# Patient Record
Sex: MALE | Race: BLACK OR AFRICAN AMERICAN | Employment: OTHER | URBAN - METROPOLITAN AREA
[De-identification: names, ages, dates, MRNs, and addresses within clinical notes are randomized per-mention and may not be internally consistent; named-entity substitution may affect disease eponyms.]

---

## 2017-02-20 PROBLEM — I16.1 HYPERTENSIVE EMERGENCY: Status: ACTIVE | Noted: 2017-02-20

## 2017-04-05 PROBLEM — J18.9 PNEUMONIA: Status: ACTIVE | Noted: 2017-04-05

## 2017-04-05 PROBLEM — R77.8 ELEVATED TROPONIN: Status: ACTIVE | Noted: 2017-04-05

## 2017-08-14 PROBLEM — N17.9 AKI (ACUTE KIDNEY INJURY) (HCC): Status: ACTIVE | Noted: 2017-08-14

## 2017-08-29 PROBLEM — I47.9 PAROXYSMAL TACHYCARDIA, UNSPECIFIED (HCC): Status: ACTIVE | Noted: 2017-08-29

## 2017-08-29 PROBLEM — G45.9 TIA (TRANSIENT ISCHEMIC ATTACK): Status: ACTIVE | Noted: 2017-08-29

## 2017-08-29 PROBLEM — R29.898 LEFT ARM WEAKNESS: Status: ACTIVE | Noted: 2017-08-29

## 2017-12-09 PROBLEM — R07.89 CHEST PRESSURE: Status: ACTIVE | Noted: 2017-12-09

## 2017-12-13 PROBLEM — T82.7XXA BACTEREMIA ASSOCIATED WITH INTRAVASCULAR LINE (HCC): Status: ACTIVE | Noted: 2017-12-13

## 2017-12-13 PROBLEM — R78.81 BACTEREMIA ASSOCIATED WITH INTRAVASCULAR LINE (HCC): Status: ACTIVE | Noted: 2017-12-13

## 2018-01-18 PROBLEM — N18.6 ESRD (END STAGE RENAL DISEASE) (HCC): Status: ACTIVE | Noted: 2018-01-18

## 2018-01-24 PROBLEM — L08.9 LEFT FOOT INFECTION: Status: ACTIVE | Noted: 2018-01-24

## 2018-02-10 PROBLEM — T82.49XA COMPLICATIONS, DIALYSIS, CATHETER, MECHANICAL (HCC): Status: ACTIVE | Noted: 2018-02-10

## 2018-02-10 PROBLEM — R45.851 SUICIDAL IDEATION: Status: ACTIVE | Noted: 2018-02-10

## 2018-03-16 PROBLEM — J18.9 PNEUMONIA: Status: RESOLVED | Noted: 2017-04-05 | Resolved: 2018-03-16

## 2018-03-16 PROBLEM — L08.9 LEFT FOOT INFECTION: Status: RESOLVED | Noted: 2018-01-24 | Resolved: 2018-03-16

## 2018-03-16 PROBLEM — R29.898 LEFT ARM WEAKNESS: Status: RESOLVED | Noted: 2017-08-29 | Resolved: 2018-03-16

## 2018-03-16 PROBLEM — I16.1 HYPERTENSIVE EMERGENCY: Status: RESOLVED | Noted: 2017-02-20 | Resolved: 2018-03-16

## 2018-03-16 PROBLEM — T82.49XA COMPLICATIONS, DIALYSIS, CATHETER, MECHANICAL (HCC): Status: RESOLVED | Noted: 2018-02-10 | Resolved: 2018-03-16

## 2018-03-16 PROBLEM — R78.81 BACTEREMIA ASSOCIATED WITH INTRAVASCULAR LINE (HCC): Status: RESOLVED | Noted: 2017-12-13 | Resolved: 2018-03-16

## 2018-03-16 PROBLEM — T82.7XXA BACTEREMIA ASSOCIATED WITH INTRAVASCULAR LINE (HCC): Status: RESOLVED | Noted: 2017-12-13 | Resolved: 2018-03-16

## 2018-03-16 PROBLEM — N17.9 AKI (ACUTE KIDNEY INJURY) (HCC): Status: RESOLVED | Noted: 2017-08-14 | Resolved: 2018-03-16

## 2018-06-07 PROBLEM — N18.6 END STAGE RENAL FAILURE ON DIALYSIS (HCC): Status: ACTIVE | Noted: 2018-06-07

## 2018-06-07 PROBLEM — Z99.2 END STAGE RENAL FAILURE ON DIALYSIS (HCC): Status: ACTIVE | Noted: 2018-06-07

## 2018-07-06 PROBLEM — A41.9 SEPSIS (HCC): Status: ACTIVE | Noted: 2018-07-06

## 2018-08-11 PROBLEM — A04.72 CLOSTRIDIUM DIFFICILE COLITIS: Status: ACTIVE | Noted: 2018-08-11

## 2018-12-30 PROBLEM — L08.9 INFECTED ULCER OF SKIN (HCC): Status: ACTIVE | Noted: 2018-12-30

## 2018-12-30 PROBLEM — L98.499 INFECTED ULCER OF SKIN (HCC): Status: ACTIVE | Noted: 2018-12-30

## 2019-03-06 ENCOUNTER — OFFICE VISIT (OUTPATIENT)
Dept: FAMILY MEDICINE CLINIC | Age: 44
End: 2019-03-06

## 2019-03-06 VITALS
HEIGHT: 68 IN | HEART RATE: 90 BPM | DIASTOLIC BLOOD PRESSURE: 95 MMHG | OXYGEN SATURATION: 96 % | SYSTOLIC BLOOD PRESSURE: 172 MMHG | TEMPERATURE: 96.4 F | WEIGHT: 245 LBS | BODY MASS INDEX: 37.13 KG/M2 | RESPIRATION RATE: 16 BRPM

## 2019-03-06 DIAGNOSIS — Z79.4 TYPE 2 DIABETES MELLITUS WITH CHRONIC KIDNEY DISEASE ON CHRONIC DIALYSIS, WITH LONG-TERM CURRENT USE OF INSULIN (HCC): Primary | ICD-10-CM

## 2019-03-06 DIAGNOSIS — I21.9 MYOCARDIAL INFARCTION, UNSPECIFIED MI TYPE, UNSPECIFIED ARTERY (HCC): ICD-10-CM

## 2019-03-06 DIAGNOSIS — Z99.2 TYPE 2 DIABETES MELLITUS WITH CHRONIC KIDNEY DISEASE ON CHRONIC DIALYSIS, WITH LONG-TERM CURRENT USE OF INSULIN (HCC): Primary | ICD-10-CM

## 2019-03-06 DIAGNOSIS — F32.A DEPRESSION, UNSPECIFIED DEPRESSION TYPE: ICD-10-CM

## 2019-03-06 DIAGNOSIS — I51.9 HEART DISEASE: ICD-10-CM

## 2019-03-06 DIAGNOSIS — N18.6 TYPE 2 DIABETES MELLITUS WITH CHRONIC KIDNEY DISEASE ON CHRONIC DIALYSIS, WITH LONG-TERM CURRENT USE OF INSULIN (HCC): Primary | ICD-10-CM

## 2019-03-06 DIAGNOSIS — B20 HIV (HUMAN IMMUNODEFICIENCY VIRUS INFECTION) (HCC): ICD-10-CM

## 2019-03-06 DIAGNOSIS — I10 ESSENTIAL HYPERTENSION: ICD-10-CM

## 2019-03-06 DIAGNOSIS — N18.6 STAGE 5 CHRONIC KIDNEY DISEASE ON CHRONIC DIALYSIS (HCC): ICD-10-CM

## 2019-03-06 DIAGNOSIS — Z99.2 STAGE 5 CHRONIC KIDNEY DISEASE ON CHRONIC DIALYSIS (HCC): ICD-10-CM

## 2019-03-06 DIAGNOSIS — E11.22 TYPE 2 DIABETES MELLITUS WITH CHRONIC KIDNEY DISEASE ON CHRONIC DIALYSIS, WITH LONG-TERM CURRENT USE OF INSULIN (HCC): Primary | ICD-10-CM

## 2019-03-06 RX ORDER — INSULIN GLARGINE 100 [IU]/ML
10 INJECTION, SOLUTION SUBCUTANEOUS
COMMUNITY
End: 2019-03-06 | Stop reason: SDUPTHER

## 2019-03-06 RX ORDER — GABAPENTIN 100 MG/1
CAPSULE ORAL 3 TIMES DAILY
COMMUNITY
End: 2019-03-06 | Stop reason: SDUPTHER

## 2019-03-06 RX ORDER — ATAZANAVIR 300 MG/1
300 CAPSULE ORAL
COMMUNITY

## 2019-03-06 RX ORDER — INSULIN LISPRO 100 [IU]/ML
5 INJECTION, SOLUTION INTRAVENOUS; SUBCUTANEOUS
COMMUNITY
End: 2019-03-06 | Stop reason: SDUPTHER

## 2019-03-06 RX ORDER — INSULIN LISPRO 100 [IU]/ML
5 INJECTION, SOLUTION INTRAVENOUS; SUBCUTANEOUS
Qty: 1 VIAL | Refills: 4 | Status: ON HOLD
Start: 2019-03-06 | End: 2019-12-23 | Stop reason: SDUPTHER

## 2019-03-06 RX ORDER — BUPROPION HYDROCHLORIDE 150 MG/1
TABLET, EXTENDED RELEASE ORAL 2 TIMES DAILY
COMMUNITY
End: 2019-12-23 | Stop reason: SDUPTHER

## 2019-03-06 RX ORDER — LOSARTAN POTASSIUM 50 MG/1
50 TABLET ORAL DAILY
Qty: 90 TAB | Refills: 0 | Status: ON HOLD | OUTPATIENT
Start: 2019-03-06 | End: 2019-12-23 | Stop reason: SDUPTHER

## 2019-03-06 RX ORDER — CARVEDILOL 25 MG/1
1 TABLET ORAL DAILY
Refills: 1 | COMMUNITY
Start: 2019-01-28 | End: 2019-12-23 | Stop reason: SDUPTHER

## 2019-03-06 RX ORDER — SEVELAMER CARBONATE 800 MG/1
800 TABLET, FILM COATED ORAL
COMMUNITY
Start: 2019-02-18 | End: 2020-03-13

## 2019-03-06 RX ORDER — TRAZODONE HYDROCHLORIDE 50 MG/1
50 TABLET ORAL
COMMUNITY

## 2019-03-06 RX ORDER — GABAPENTIN 100 MG/1
100 CAPSULE ORAL 3 TIMES DAILY
Qty: 180 CAP | Refills: 0 | Status: ON HOLD | OUTPATIENT
Start: 2019-03-06 | End: 2019-12-23 | Stop reason: SDUPTHER

## 2019-03-06 RX ORDER — PROMETHAZINE HYDROCHLORIDE 12.5 MG/1
12.5 TABLET ORAL
COMMUNITY
End: 2020-08-17

## 2019-03-06 RX ORDER — FLUOXETINE HYDROCHLORIDE 20 MG/1
20 CAPSULE ORAL DAILY
Qty: 90 CAP | Refills: 0 | Status: SHIPPED | OUTPATIENT
Start: 2019-03-06 | End: 2021-04-24

## 2019-03-06 RX ORDER — RITONAVIR 100 MG/1
100 TABLET, FILM COATED ORAL
COMMUNITY

## 2019-03-06 RX ORDER — CLINDAMYCIN HYDROCHLORIDE 300 MG/1
1 CAPSULE ORAL DAILY
Refills: 0 | COMMUNITY
Start: 2019-01-02 | End: 2019-10-03 | Stop reason: ALTCHOICE

## 2019-03-06 RX ORDER — INSULIN GLARGINE 100 [IU]/ML
10 INJECTION, SOLUTION SUBCUTANEOUS
Qty: 1 VIAL | Refills: 4 | Status: ON HOLD | OUTPATIENT
Start: 2019-03-06 | End: 2019-12-23

## 2019-03-06 NOTE — PROGRESS NOTES
Patient is here to establish care with new pcp. Patient brought all medications in, he is requesting a refill on some, patient will discuss with provider. 1. Have you been to the ER, urgent care clinic since your last visit? Hospitalized since your last visit?no    2. Have you seen or consulted any other health care providers outside of the [address] [place] [address] since your last visit? Include any pap smears or colon screening.  no

## 2019-03-07 NOTE — PROGRESS NOTES
ESTABLISH CARE VISIT    SUBJECTIVE:     Chief Complaint   Patient presents with    Establish Care    GERD       HPI: 44 y.o. male with PMHx significant for HIV, heart disease, heart attack (at 38 yo), diabetes, depression, CKD is here for the above chief complaint(s). Patient's last PCP was     Chronic Kidney Disease  Patient has been on dialysis for 1 year, nephrologist is Dr. Alexis Cuevas. Will request records. [address]. Patient saw nephrologist 3 weeks ago. Diabetes  Patient is taking Lantus 10 units in the evening and Humalog 5 units with meals. He has been checking his glucose levels. Fasting typically around 130's about the same before meals. Patient denies frequent urination, excessive thirst, hypoglycemic events (lightheadedness/dizziness), nausea, vomiting, shakiness. GERD  Patient has had heartburn, nausea and epigastric pain. He does have history of nausea and some vomiting, he has had 4-5 episodes of vomiting over the last week. Patient has tried prilosec and TUMS with mild relief, but still having heartburn. Symptoms are intermittent, some days the symptoms last all day. Patient has never had a colonoscopy, no family history of colon cancer. Bowel movements- have been small, \"rabbit pellets. \"     HIV  Patient was seeing Dr. Chris Humphrey, but has difficulty getting there. He states that his counts have been good and he has been taking his medication regularly. He needs new ID physician. Health Maintenance:    Eye   no complaints, last eye exam: 1/2018, needs an exam  Oral Health   no complaints, last exam: a LONG time, will give referral  U/A   no UTI sxs, patient has chronic kidney on dialysis x 1 year  Cardiac- denies history, denies chest pain. patient has coronary stent- 3 years ago  Pulmonary health/Smoking history- , denies cough, SOB.- Smoking - Current non-smoker.  Lung cancer screening: with low-dose computed tomography  (LDCT) in adults aged 54 to [de-identified] years who have a 27 pack-year smoking history and currently smoke or have quit within the past 15 yea  Testicular Exam - does self-exams, declines exam today. Toney Echols- 3 weeks    Review of Systems   Constitutional: Negative for chills, fever, malaise/fatigue and weight loss. Eyes: Negative for blurred vision, double vision and pain. Respiratory: Negative for cough, sputum production, shortness of breath and wheezing. Cardiovascular: Negative for chest pain, palpitations, orthopnea, claudication and leg swelling. Gastrointestinal: Positive for heartburn, nausea and vomiting. Negative for abdominal pain, blood in stool, constipation, diarrhea and melena. Genitourinary: Negative for dysuria, frequency and urgency. Skin: Negative for itching () and rash. Neurological: Negative for dizziness, tingling and headaches. [unfilled]  Current Outpatient Medications   Medication Sig    carvedilol (COREG) 25 mg tablet Take 1 Tab by mouth daily.  sevelamer carbonate (RENVELA) 800 mg tab tab Take 1 Tab by mouth daily.  clindamycin (CLEOCIN) 300 mg capsule Take 1 Cap by mouth daily.  atazanavir (REYATAZ) 300 mg capsule Take 300 mg by mouth Daily (before breakfast).  traZODone (DESYREL) 50 mg tablet Take  by mouth nightly.  gabapentin (NEURONTIN) 100 mg capsule Take  by mouth three (3) times daily.  buPROPion SR (WELLBUTRIN SR) 150 mg SR tablet Take  by mouth two (2) times a day.  promethazine (PHENERGAN) 12.5 mg tablet Take  by mouth every six (6) hours as needed for Nausea.  ritonavir (NORVIR) 100 mg tablet Take  by mouth two (2) times daily (with meals).  dolutegravir (TIVICAY) 50 mg tab tablet Take  by mouth. No current facility-administered medications for this visit.       Health Maintenance   Topic Date Due    DTaP/Tdap/Td series (1 - Tdap) 03/27/1997    Influenza Age 5 to Adult  08/01/2018       Medications and Allergies: Reviewed and confirmed in the chart    Past Medical Hx: Reviewed and confirmed in the chart  No past medical history on file. There is no problem list on file for this patient. Family Hx, Surgical Hx, Social Hx: Reviewed and updated in EMR    OBJECTIVE:  Vitals:    03/06/19 1349   BP: (!) 172/95   Pulse: 90   Resp: 16   Temp: 96.4 °F (35.8 °C)   TempSrc: Oral   SpO2: 96%   Weight: 245 lb (111.1 kg)   Height: 5' 8\" (1.727 m)       BP Readings from Last 3 Encounters:   03/06/19 (!) 172/95     Wt Readings from Last 3 Encounters:   03/06/19 245 lb (111.1 kg)     Physical Exam   Constitutional: He is oriented to person, place, and time and well-developed, well-nourished, and in no distress. No distress. Neck: Normal range of motion. Neck supple. No thyromegaly present. Cardiovascular: Normal rate, regular rhythm, normal heart sounds and intact distal pulses. Exam reveals no gallop and no friction rub. No murmur heard. Pulmonary/Chest: Effort normal and breath sounds normal. No respiratory distress. He has no wheezes. He has no rales. He exhibits no tenderness. Abdominal: Soft. Bowel sounds are normal. He exhibits no distension and no mass. There is no tenderness. There is no rebound and no guarding. Lymphadenopathy:     He has no cervical adenopathy. Neurological: He is alert and oriented to person, place, and time. Skin: Skin is warm and dry. He is not diaphoretic. Psychiatric: Mood, memory, affect and judgment normal.   Nursing note and vitals reviewed. Nursing Notes Reviewed    ASSESSMENT AND PLAN  Diagnoses and all orders for this visit:    1. Type 2 diabetes mellitus with chronic kidney disease on chronic dialysis, with long-term current use of insulin (HCC)  -     CBC WITH AUTOMATED DIFF; Future  -     METABOLIC PANEL, COMPREHENSIVE; Future  -     HEMOGLOBIN A1C WITH EAG; Future  -     REFERRAL TO INFECTIOUS DISEASE    2. HIV (human immunodeficiency virus infection) ([facility]  -     REFERRAL TO INFECTIOUS DISEASE    3.  Depression, unspecified depression type  -     REFERRAL TO PSYCHIATRY  -     FLUoxetine (PROZAC) 20 mg capsule; Take 1 Cap by mouth daily. 4. Stage 5 chronic kidney disease on chronic dialysis ([facility]  -     CBC WITH AUTOMATED DIFF; Future  -     METABOLIC PANEL, COMPREHENSIVE; Future    5. Heart disease  -     REFERRAL TO CARDIOLOGY    6. Myocardial infarction, unspecified MI type, unspecified artery (HCC)  -     REFERRAL TO CARDIOLOGY    7. Essential hypertension  -     losartan (COZAAR) 50 mg tablet; Take 1 Tab by mouth daily. Other orders  -     insulin lispro (HUMALOG U-100 INSULIN) 100 unit/mL injection; 5 Units by SubCUTAneous route three (3) times daily (with meals). -     insulin glargine (LANTUS U-100 INSULIN) 100 unit/mL injection; 10 Units by SubCUTAneous route nightly.  -     gabapentin (NEURONTIN) 100 mg capsule; Take 1 Cap by mouth three (3) times daily. 8. GERD     - Omeprazole 20mg daily x 6 weeks. Follow-up in 1 month for re-check. Orders Placed This Encounter    carvedilol (COREG) 25 mg tablet    sevelamer carbonate (RENVELA) 800 mg tab tab    clindamycin (CLEOCIN) 300 mg capsule    atazanavir (REYATAZ) 300 mg capsule    traZODone (DESYREL) 50 mg tablet    gabapentin (NEURONTIN) 100 mg capsule    buPROPion SR (WELLBUTRIN SR) 150 mg SR tablet    promethazine (PHENERGAN) 12.5 mg tablet    ritonavir (NORVIR) 100 mg tablet    dolutegravir (TIVICAY) 50 mg tab tablet       No future appointments. AVS printed and provided to patient    Assessment and plan above discussed with patient, patient voiced understanding and agreement with plan. More than 50% of this 30 minute visit was spent face to face counseling the patient about the etiology and treatment options for the above health conditions outlined in assessment and plan     Lexy Johnston [address], [address] [place] [address], [address] [place] [address] [phone]  [place] [phone] or [phone]

## 2019-03-11 ENCOUNTER — TELEPHONE (OUTPATIENT)
Dept: FAMILY MEDICINE CLINIC | Age: 44
End: 2019-03-11

## 2019-03-11 DIAGNOSIS — E11.22 TYPE 2 DIABETES MELLITUS WITH CHRONIC KIDNEY DISEASE ON CHRONIC DIALYSIS, WITH LONG-TERM CURRENT USE OF INSULIN (HCC): Primary | ICD-10-CM

## 2019-03-11 DIAGNOSIS — Z99.2 TYPE 2 DIABETES MELLITUS WITH CHRONIC KIDNEY DISEASE ON CHRONIC DIALYSIS, WITH LONG-TERM CURRENT USE OF INSULIN (HCC): Primary | ICD-10-CM

## 2019-03-11 DIAGNOSIS — N18.6 TYPE 2 DIABETES MELLITUS WITH CHRONIC KIDNEY DISEASE ON CHRONIC DIALYSIS, WITH LONG-TERM CURRENT USE OF INSULIN (HCC): Primary | ICD-10-CM

## 2019-03-11 DIAGNOSIS — Z79.4 TYPE 2 DIABETES MELLITUS WITH CHRONIC KIDNEY DISEASE ON CHRONIC DIALYSIS, WITH LONG-TERM CURRENT USE OF INSULIN (HCC): Primary | ICD-10-CM

## 2019-03-11 NOTE — TELEPHONE ENCOUNTER
Please let patient know that I put in referral for endocrinology. Thank you. Lexy Martinez PA-C  [address] [facility]  [phone]  [address]

## 2019-03-11 NOTE — TELEPHONE ENCOUNTER
Left a message on patient's voicemail to inform him that a referral for endocrinology was ordered and that he will be getting a call from that office to schedule appt.

## 2019-03-26 ENCOUNTER — OFFICE VISIT (OUTPATIENT)
Dept: FAMILY MEDICINE CLINIC | Age: 44
End: 2019-03-26

## 2019-03-26 VITALS
RESPIRATION RATE: 18 BRPM | OXYGEN SATURATION: 98 % | WEIGHT: 251 LBS | HEIGHT: 68 IN | BODY MASS INDEX: 38.04 KG/M2 | TEMPERATURE: 97.3 F | SYSTOLIC BLOOD PRESSURE: 165 MMHG | HEART RATE: 86 BPM | DIASTOLIC BLOOD PRESSURE: 89 MMHG

## 2019-03-26 DIAGNOSIS — N18.6 TYPE 2 DIABETES MELLITUS WITH CHRONIC KIDNEY DISEASE ON CHRONIC DIALYSIS, WITH LONG-TERM CURRENT USE OF INSULIN (HCC): ICD-10-CM

## 2019-03-26 DIAGNOSIS — I51.9 HEART DISEASE: ICD-10-CM

## 2019-03-26 DIAGNOSIS — K59.00 CONSTIPATION, UNSPECIFIED CONSTIPATION TYPE: Primary | ICD-10-CM

## 2019-03-26 DIAGNOSIS — B20 HIV (HUMAN IMMUNODEFICIENCY VIRUS INFECTION) (HCC): ICD-10-CM

## 2019-03-26 DIAGNOSIS — Z79.4 TYPE 2 DIABETES MELLITUS WITH CHRONIC KIDNEY DISEASE ON CHRONIC DIALYSIS, WITH LONG-TERM CURRENT USE OF INSULIN (HCC): ICD-10-CM

## 2019-03-26 DIAGNOSIS — Z99.2 STAGE 5 CHRONIC KIDNEY DISEASE ON CHRONIC DIALYSIS (HCC): ICD-10-CM

## 2019-03-26 DIAGNOSIS — N18.6 STAGE 5 CHRONIC KIDNEY DISEASE ON CHRONIC DIALYSIS (HCC): ICD-10-CM

## 2019-03-26 DIAGNOSIS — I10 ESSENTIAL HYPERTENSION: ICD-10-CM

## 2019-03-26 DIAGNOSIS — Z99.2 TYPE 2 DIABETES MELLITUS WITH CHRONIC KIDNEY DISEASE ON CHRONIC DIALYSIS, WITH LONG-TERM CURRENT USE OF INSULIN (HCC): ICD-10-CM

## 2019-03-26 DIAGNOSIS — E11.22 TYPE 2 DIABETES MELLITUS WITH CHRONIC KIDNEY DISEASE ON CHRONIC DIALYSIS, WITH LONG-TERM CURRENT USE OF INSULIN (HCC): ICD-10-CM

## 2019-03-26 PROBLEM — E66.01 SEVERE OBESITY (HCC): Status: ACTIVE | Noted: 2019-03-26

## 2019-03-26 RX ORDER — POLYETHYLENE GLYCOL 3350 17 G/17G
17 POWDER, FOR SOLUTION ORAL DAILY
Qty: 510 G | Refills: 0 | Status: ON HOLD | OUTPATIENT
Start: 2019-03-26 | End: 2019-12-23

## 2019-03-26 NOTE — PATIENT INSTRUCTIONS
[address]  Phone: [phone]  Phone: [phone] (Intake Line)   [phone] (Emergency)  Fax: [phone]  [facility]  Mendoza Baker NP  [address] [facility], [facility] [facility] [address]  Phone: [phone]  Fax: [phone] internally by provider     [facility]  [address] [facility], [place]  [address] [facility]  [address] [facility], [address] [facility]  Phone: [phone]  Fax: [phone]  Alt Fax: [phone]          Constipation   To Prevent This:    EAT- Anything that start with letter pineapple, prunes, pears, peas\" and anything green in color. Drink at least 64 oz of non-calorie fluid a day    If you become constipated:     1. Start with a Stool Softener (produces a bowel movement in 72 hr):   Examples:      Miralax 1 capful twice a day (It's OK to go up to 3 caps for a few days)      Dulcolax Stool Softener 100 mg (It's OK to go up 4 capsules a day)       2. Next: If you still have constipation after 2 or 3 days, add a Stimulant          Laxative (this will produce a bowel movement in 6 - 12 hr):    Examples:       Exlax (1 small square) for up to 4 days       Dulcolax stimulant laxative (8.25 mg)        Magnesium citrate pill 500 mg 3 - 4 pills a day       3. Or you can go straight to a combination Stool Softener / Stimulant at night. If you need, you can add a morning dose. Examples:       Pericolace 50 mg / 8.25 mg       Senokot-S  50 mg / 8.25 mg       4. Finally If You're Really locked up, get Liquid Magnesium Citrate from your local pharmacy and take it according to the directions.

## 2019-03-26 NOTE — PROGRESS NOTES
Patient is here for 1 month follow up for htn and dm. 1. Have you been to the ER, urgent care clinic since your last visit? Hospitalized since your last visit?no    2. Have you seen or consulted any other health care providers outside of the [address] [place] [address] since your last visit? Include any pap smears or colon screening.  no

## 2019-03-26 NOTE — PROGRESS NOTES
Per provider, called patient to request that he check his bp at home and that if SBP is greater than 140 or DBP is greater than 90, patient is to increase his losartan. Asked patient what dose of Losartan he is taking. Patient states that he is not sure but that he will call back tomorrow with that information since her will not be home until after 7pm tonight.

## 2019-10-03 ENCOUNTER — OFFICE VISIT (OUTPATIENT)
Dept: FAMILY MEDICINE CLINIC | Age: 44
End: 2019-10-03

## 2019-10-03 VITALS
TEMPERATURE: 98.1 F | SYSTOLIC BLOOD PRESSURE: 134 MMHG | RESPIRATION RATE: 18 BRPM | DIASTOLIC BLOOD PRESSURE: 88 MMHG | HEIGHT: 68 IN | HEART RATE: 102 BPM | OXYGEN SATURATION: 98 % | BODY MASS INDEX: 36.98 KG/M2 | WEIGHT: 244 LBS

## 2019-10-03 DIAGNOSIS — Z99.2 TYPE 2 DIABETES MELLITUS WITH CHRONIC KIDNEY DISEASE ON CHRONIC DIALYSIS, WITH LONG-TERM CURRENT USE OF INSULIN (HCC): ICD-10-CM

## 2019-10-03 DIAGNOSIS — E11.22 TYPE 2 DIABETES MELLITUS WITH CHRONIC KIDNEY DISEASE ON CHRONIC DIALYSIS, WITH LONG-TERM CURRENT USE OF INSULIN (HCC): ICD-10-CM

## 2019-10-03 DIAGNOSIS — B20 HIV INFECTION, UNSPECIFIED SYMPTOM STATUS (HCC): ICD-10-CM

## 2019-10-03 DIAGNOSIS — Z79.4 TYPE 2 DIABETES MELLITUS WITH CHRONIC KIDNEY DISEASE ON CHRONIC DIALYSIS, WITH LONG-TERM CURRENT USE OF INSULIN (HCC): ICD-10-CM

## 2019-10-03 DIAGNOSIS — H65.192 OTHER NON-RECURRENT ACUTE NONSUPPURATIVE OTITIS MEDIA OF LEFT EAR: Primary | ICD-10-CM

## 2019-10-03 DIAGNOSIS — Z99.2 END STAGE RENAL FAILURE ON DIALYSIS (HCC): ICD-10-CM

## 2019-10-03 DIAGNOSIS — N18.6 TYPE 2 DIABETES MELLITUS WITH CHRONIC KIDNEY DISEASE ON CHRONIC DIALYSIS, WITH LONG-TERM CURRENT USE OF INSULIN (HCC): ICD-10-CM

## 2019-10-03 DIAGNOSIS — N18.6 END STAGE RENAL FAILURE ON DIALYSIS (HCC): ICD-10-CM

## 2019-10-03 RX ORDER — FLUTICASONE PROPIONATE 50 MCG
2 SPRAY, SUSPENSION (ML) NASAL DAILY
Qty: 1 BOTTLE | Refills: 0 | Status: ON HOLD | OUTPATIENT
Start: 2019-10-03 | End: 2019-12-23

## 2019-10-03 RX ORDER — AMOXICILLIN AND CLAVULANATE POTASSIUM 500; 125 MG/1; MG/1
1 TABLET, FILM COATED ORAL 2 TIMES DAILY
Qty: 20 TAB | Refills: 0 | Status: SHIPPED | OUTPATIENT
Start: 2019-10-03 | End: 2019-10-13

## 2019-10-03 NOTE — PROGRESS NOTES
Internal Medicine  Transition of Care Note/Hospital or Rehab Follow up  [facility]  [address], [place], [facility], [address]  Phone [phone]; Fax [phone]    Date of Service:  10/03/2019  Patient's Name and : Grace Sanon 1975   Assessment/Plan:       Grace Sanon is a 38 y.o. male seen today for follow up after hospitalization for acute OE and acute URI. Medications - No data to display  Medical Decision Making   56-year-old male with a history of HIV, stage renal disease, diabetes, hypertension, MI, and stroke presenting with complaints of right ear pain x 1 day. He states he has associated chills, right blurry vision, increased congestion, sore throat that began today, as well as a headache. Denies fever, dizziness, sensory changes, changes in each, focal weakness, chest pain, palpitation, shortness of breath.     Physical exam patient per tensive, he had increased heart rate to 104, but he has been afebrile. On exam patient is well-appearing laughing during examination, he had full range of motion of his neck, no cervical lymphadenopathy, no meningeal signs, or soft tissue neck swelling erythema or signs of infection. Patients visual acuity 20/25 b/l, right 20/50, left 20/25. Patient with palpation of his external ear, erythematous right ear canal, but normal TMs bilaterally. He had erythematous and edematous turbinates in bilateral nostrils, and erythematous posterior oropharynx. Patient was offered chest x-ray for his tachycardia, but denied reporting that he just wants the ear drops and go home.     Patient's presentation consistent with acute otitis externa with an acute upper respiratory infection. Patient was educated and verbalized understanding to return to ER immediately for any worsening symptoms. Patient was amenable to plan of discharge.     Final Diagnosis          ICD-10-CM ICD-9-CM   1.  Acute otitis externa of right ear, unspecified type H60.501 380.10   2. Acute upper respiratory infection J06.9 465.9     HIV  Patient saw ID last month, needs to get blood work to update his CD4 count. Nephrology  Patient saw nephrologist yesterday, needs updated CD4 count in order to be put on kidney donor list. Patient is still urinating, no changes in medication. Patient goes to dialysis 3 times a week. DM-2  Key Antihyperglycemic Medications             insulin lispro (HUMALOG U-100 INSULIN) 100 unit/mL injection (Taking) 5 Units by SubCUTAneous route three (3) times daily (with meals). insulin glargine (LANTUS U-100 INSULIN) 100 unit/mL injection (Taking) 25 U nits by SubCUTAneous route nightly. Patient sees endocrinology for his diabetes. Patient is taking 5 units of humalog for snack, 7 units for smaller meal, 12 units for large meal.    Lantus at night- 25 units  Last visit with endocrinologist was 2 months ago. Patient sees Dr. Raymundo Casey for DM. No notes in chart. Will request records. Lab Results   Component Value Date/Time    Hemoglobin A1c 9.2 (H) 12/31/2018 04:17 AM       medication compliance: compliant all of the time, diabetic diet compliance: compliant most of the time, home glucose monitoring: is not performed    Hypertension  Patient is currently taking   Key CAD CHF Meds             carvedilol (COREG) 25 mg tablet (Taking) Take 1 Tab by mouth two (2) times daily (with meals). losartan (COZAAR) 50 mg tablet (Taking) Take 1 Tab by mouth daily. amLODIPine (NORVASC) 10 mg tablet (Taking) Take 1 Tab by mouth daily. atorvastatin (LIPITOR) 40 mg tablet (Taking) Take 1 Tab by mouth nightly. aspirin 81 mg chewable tablet (Taking) Take 1 Tab by mouth daily. carvedilol (COREG) 25 mg tablet Take 1 Tab by mouth daily. losartan (COZAAR) 50 mg tablet Take 1 Tab by mouth daily.       . BP today is   BP Readings from Last 1 Encounters:   10/03/19 134/88     Patient has been taking medications as prescribed. Patient is not currently exercising. Patient denies chest pain, shortness of breath, palpitations, lower extremity edema, dizziness/lightheadedness or syncopal episodes. Key CAD CHF Meds             carvedilol (COREG) 25 mg tablet (Taking) Take 1 Tab by mouth two (2) times daily (with meals). losartan (COZAAR) 50 mg tablet (Taking) Take 1 Tab by mouth daily. amLODIPine (NORVASC) 10 mg tablet (Taking) Take 1 Tab by mouth daily. atorvastatin (LIPITOR) 40 mg tablet (Taking) Take 1 Tab by mouth nightly. aspirin 81 mg chewable tablet (Taking) Take 1 Tab by mouth daily. carvedilol (COREG) 25 mg tablet Take 1 Tab by mouth daily. losartan (COZAAR) 50 mg tablet Take 1 Tab by mouth daily. Elevated Lipids    Key CAD CHF Meds             carvedilol (COREG) 25 mg tablet (Taking) Take 1 Tab by mouth two (2) times daily (with meals). losartan (COZAAR) 50 mg tablet (Taking) Take 1 Tab by mouth daily. amLODIPine (NORVASC) 10 mg tablet (Taking) Take 1 Tab by mouth daily. atorvastatin (LIPITOR) 40 mg tablet (Taking) Take 1 Tab by mouth nightly. aspirin 81 mg chewable tablet (Taking) Take 1 Tab by mouth daily. carvedilol (COREG) 25 mg tablet Take 1 Tab by mouth daily. losartan (COZAAR) 50 mg tablet Take 1 Tab by mouth daily. Lab Results   Component Value Date/Time    Cholesterol, total 176 12/10/2017 03:04 AM    HDL Cholesterol 41 12/10/2017 03:04 AM    LDL, calculated 83 12/10/2017 03:04 AM    Triglyceride 260 (H) 12/10/2017 03:04 AM    CHOL/HDL Ratio 4.3 12/10/2017 03:04 AM       Lexy Martinez    History:            Patient was not contacted by office to arrange appointment. Hospital notes, testing results and discharge summary reviewed and briefly summarized below. Patient states that he continues to have right ear pain. Patient was given ofloxacin drop for this as prescribed by ED.  Patient has not noticed any changes to his right ear pain since starting this medication. Patient states that his right ear is painful. Patient states that pain hurts worse when he doesn't blow his nose. He gets relief when blowing his nose. Patient states that he has noticed some popping sensation as well. Patient does note sinus congestion and sore throat yesterday that resolved. Patient also notes that he has a cough, that was productive starting yesterday. He denies any shortness of breath, fevers/chills, abdominal pain, n/v/d. Review of Systems   Constitutional: Negative for chills, fever, malaise/fatigue and weight loss. HENT: Positive for congestion and ear pain. Negative for ear discharge, hearing loss, nosebleeds, sinus pain, sore throat and tinnitus. Eyes: Negative for blurred vision, double vision and pain. Respiratory: Negative for cough, sputum production, shortness of breath, wheezing and stridor. Cardiovascular: Negative for chest pain, palpitations, orthopnea, claudication and leg swelling. Gastrointestinal: Negative for abdominal pain, constipation, diarrhea, nausea and vomiting. Genitourinary: Negative for dysuria, frequency and urgency. Neurological: Negative for dizziness, tingling and headaches.          Patient Active Problem List    Diagnosis    ESRD (end stage renal disease) ([facility]    Severe obesity ([facility]    HIV (human immunodeficiency virus infection) ([facility]    Heart disease    Heart attack ([facility]    Generalized headaches    Diabetes ([facility]    Depression    Chronic kidney disease    Infected ulcer of skin ([facility]    Clostridium difficile colitis    Sepsis ([facility]    End stage renal failure on dialysis ([facility]    Suicidal ideation    Chest pressure    Paroxysmal tachycardia, unspecified ([address]    TIA (transient ischemic attack)    Elevated troponin    Myocardial infarct (HCC)    Proteinuria    HIV (human immunodeficiency virus infection) ([address]           Objective:     /88   Pulse (!) 102   Temp 98.1 °F (36.7 °C) (Oral) Resp 18   Ht 5' 8\" (1.727 m)   Wt 244 lb (110.7 kg)   SpO2 98%   BMI 37.10 kg/m²     Physical Exam   Constitutional: He is oriented to person, place, and time and well-developed, well-nourished, and in no distress. No distress. HENT:   Right Ear: No drainage, swelling or tenderness. Tympanic membrane is retracted. Tympanic membrane is not injected, not scarred, not perforated, not erythematous and not bulging. A middle ear effusion is present. No decreased hearing is noted. Left Ear: No drainage, swelling or tenderness. Tympanic membrane is erythematous and retracted. Tympanic membrane is not injected, not scarred, not perforated and not bulging. A middle ear effusion is present. No decreased hearing is noted. Nose: Mucosal edema and nasal deformity present. No rhinorrhea or sinus tenderness. Mouth/Throat: Uvula is midline, oropharynx is clear and moist and mucous membranes are normal.   Neck: Normal range of motion. Neck supple. No thyromegaly present. Cardiovascular: Normal rate, regular rhythm, normal heart sounds and intact distal pulses. Exam reveals no gallop and no friction rub. No murmur heard. Pulmonary/Chest: Effort normal and breath sounds normal. No respiratory distress. He has no wheezes. He has no rales. He exhibits no tenderness. Lymphadenopathy:     He has no cervical adenopathy. Neurological: He is alert and oriented to person, place, and time. Skin: Skin is warm and dry. He is not diaphoretic. Psychiatric: Mood, memory, affect and judgment normal.   Vitals reviewed. Medications:     Current Outpatient Medications   Medication Sig    ofloxacin (FLOXIN) 0.3 % otic solution Administer 10 Drops in right ear daily for 7 days.  atazanavir (REYATAZ) 300 mg capsule Take 300 mg by mouth Daily (before breakfast).  FLUoxetine (PROZAC) 20 mg capsule Take 1 Cap by mouth daily.     insulin lispro (HUMALOG U-100 INSULIN) 100 unit/mL injection 5 Units by SubCUTAneous route three (3) times daily (with meals).  insulin glargine (LANTUS U-100 INSULIN) 100 unit/mL injection 10 Units by SubCUTAneous route nightly.  sevelamer carbonate (RENVELA) 800 mg tab tab Take  by mouth daily.  calcium carbonate (TUMS) 200 mg calcium (500 mg) chew Take 1 Tab by mouth as needed.  omeprazole (PRILOSEC) 20 mg capsule Take 20 mg by mouth daily.  dolutegravir (TIVICAY) 50 mg tab tablet Take 1 Tab by mouth daily.  abacavir (ZIAGEN) 300 mg tablet Take 2 Tabs by mouth daily. Indications: HIV infection    lamiVUDine (EPIVIR) 10 mg/mL solution Take 2.5 mL by mouth daily. Indications: HIV infection    carvedilol (COREG) 25 mg tablet Take 1 Tab by mouth two (2) times daily (with meals).  gabapentin (NEURONTIN) 100 mg capsule Take 1 Cap by mouth three (3) times daily. Indications: NEUROPATHIC PAIN    buPROPion SR (WELLBUTRIN SR) 150 mg SR tablet Take 1 Tab by mouth two (2) times a day.  losartan (COZAAR) 50 mg tablet Take 1 Tab by mouth daily.  amLODIPine (NORVASC) 10 mg tablet Take 1 Tab by mouth daily.  atorvastatin (LIPITOR) 40 mg tablet Take 1 Tab by mouth nightly.  ergocalciferol (ERGOCALCIFEROL) 50,000 unit capsule Take 1 Cap by mouth every seven (7) days. (Patient taking differently: Take 10,000 Units by mouth daily. Indications: Pt stated he takes on Sunday)    aspirin 81 mg chewable tablet Take 1 Tab by mouth daily.  ondansetron (ZOFRAN ODT) 4 mg disintegrating tablet Take 1 Tab by mouth every eight (8) hours as needed for Nausea.  polyethylene glycol (MIRALAX) 17 gram/dose powder Take 17 g by mouth daily.  carvedilol (COREG) 25 mg tablet Take 1 Tab by mouth daily.  sevelamer carbonate (RENVELA) 800 mg tab tab Take 1 Tab by mouth daily.  clindamycin (CLEOCIN) 300 mg capsule Take 1 Cap by mouth daily.  traZODone (DESYREL) 50 mg tablet Take  by mouth nightly.  buPROPion SR (WELLBUTRIN SR) 150 mg SR tablet Take  by mouth two (2) times a day.     promethazine (PHENERGAN) 12.5 mg tablet Take  by mouth every six (6) hours as needed for Nausea.  ritonavir (NORVIR) 100 mg tablet Take  by mouth two (2) times daily (with meals).  dolutegravir (TIVICAY) 50 mg tab tablet Take  by mouth.  losartan (COZAAR) 50 mg tablet Take 1 Tab by mouth daily.  gabapentin (NEURONTIN) 100 mg capsule Take 1 Cap by mouth three (3) times daily.  insulin glargine (LANTUS) 100 unit/mL injection 10 Units by SubCUTAneous route nightly. (Patient taking differently: 25 Units by SubCUTAneous route nightly.)    b complex-vitamin c-folic acid (NEPHROCAPS) 1 mg capsule Take 1 Cap by mouth daily.  insulin lispro (HUMALOG) 100 unit/mL injection 5 Units by SubCUTAneous route three (3) times daily (after meals). (Patient taking differently: 5 Units by SubCUTAneous route Before breakfast, lunch, and dinner.)    FLUoxetine (PROZAC) 10 mg capsule Take 20 mg by mouth daily. No current facility-administered medications for this visit.         Additional History     Past Surgical History:   Procedure Laterality Date    CARDIAC CATHETERIZATION  9/10/2016         CARDIAC SURG PROCEDURE UNLIST      stent    HX ARTERIOVENOUS GRAFT      HX CYST INCISION AND DRAINAGE      Abscess removal    HX ORTHOPAEDIC      Left foot    [facility]ESS [facility]/2018    [facility]     Social History     Socioeconomic History    Marital status: LEGALLY      Spouse name: Not on file    Number of children: Not on file    Years of education: Not on file    Highest education level: Not on file   Tobacco Use    Smoking status: Current Every Day Smoker     Years: 0.00    Smokeless tobacco: Never Used    Tobacco comment: 7 cigarettes a day   Substance and Sexual Activity    Alcohol use: No     Frequency: Never    Drug use: Yes     Types: Marijuana     Comment: last used 2016    Sexual activity: Not Currently     Partners: Female   Social History Narrative    ** Merged History Encounter ** Family History   Problem Relation Age of Onset    Attention Deficit Hyperactivity Disorder Mother     Hypertension Mother     Elevated Lipids Mother     Diabetes Mother      Allergies   Allergen Reactions    Mylanta [Aluminum-Magnesium Hydroxide] Hives    Simethicone Hives       Encounter Diagnoses:   Diagnoses and all orders for this visit:    1. Other non-recurrent acute nonsuppurative otitis media of left ear  -     amoxicillin-clavulanate (AUGMENTIN) 500-125 mg per tablet; Take 1 Tab by mouth two (2) times a day for 10 days. -     fluticasone propionate (FLONASE) 50 mcg/actuation nasal spray; 2 Sprays by Both Nostrils route daily. 2. HIV infection, unspecified symptom status ([facility]  Follow-up with infectious disease. Continue current management. .  3. 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 Follow-up with endocrinologist as scheduled    4. End stage renal failure on dialysis [facility])  Continue with renal dialysis as scheduled. Follow-up with nephrology as advised. Advised patient to follow-up in 6 weeks. This document may have been created with the aid of dictation software. Text may contain errors, particularly phonetic errors.

## 2019-10-03 NOTE — PROGRESS NOTES
Chief Complaint   Patient presents with   [facility] Follow Up     [facility] URI, ear infection     1. Have you been to the ER, urgent care clinic since your last visit? Hospitalized since your last visit? [facility]    2. Have you seen or consulted any other health care providers outside of the [address] [place] [address] since your last visit? Include any pap smears or colon screening.  No

## 2019-11-05 ENCOUNTER — OFFICE VISIT (OUTPATIENT)
Dept: FAMILY MEDICINE CLINIC | Age: 44
End: 2019-11-05

## 2019-11-05 VITALS
OXYGEN SATURATION: 100 % | RESPIRATION RATE: 18 BRPM | BODY MASS INDEX: 37.44 KG/M2 | TEMPERATURE: 97.3 F | SYSTOLIC BLOOD PRESSURE: 152 MMHG | DIASTOLIC BLOOD PRESSURE: 90 MMHG | WEIGHT: 247 LBS | HEIGHT: 68 IN | HEART RATE: 91 BPM

## 2019-11-05 DIAGNOSIS — B20 HIV INFECTION, UNSPECIFIED SYMPTOM STATUS (HCC): ICD-10-CM

## 2019-11-05 DIAGNOSIS — E11.40 TYPE 2 DIABETES MELLITUS WITH DIABETIC NEUROPATHY, WITH LONG-TERM CURRENT USE OF INSULIN (HCC): ICD-10-CM

## 2019-11-05 DIAGNOSIS — Z79.4 TYPE 2 DIABETES MELLITUS WITH DIABETIC NEUROPATHY, WITH LONG-TERM CURRENT USE OF INSULIN (HCC): ICD-10-CM

## 2019-11-05 DIAGNOSIS — N18.6 ESRD (END STAGE RENAL DISEASE) (HCC): Primary | ICD-10-CM

## 2019-11-05 DIAGNOSIS — E11.21 TYPE 2 DIABETES WITH NEPHROPATHY (HCC): ICD-10-CM

## 2019-11-05 NOTE — LETTER
Name:Laney Quezada Query :1975 MR #:<[id number]> [id number] Page 1 of 5 CONTROLLED SUBSTANCE AGREEMENT I may be prescribed medications that are controlled substances as part  of my treatment plan for management of my medical condition(s). The goal of my treatment plan is to maintain and/or improve my health and wellbeing. Because controlled substances have an increased risk of abuse or harm, continual re-evaluation is needed determine if the goals of my treatment plan are being met for my safety and the safety of others. Donna Pop  am entering into this Controlled Substance Agreement with my provider, __________________________________ at [address] [facility] . I understand that successful treatment requires mutual trust and honesty between me and my provider. I understand that there are state and federal laws and regulations which apply to the medications that my provider may prescribe that must be followed. I understand there are risks and benefits ts of taking the medicines that my provider may prescribe. I understand and agree that following this Agreement is necessary in continuing my provider-patient relationship and success of my treatment plan. As a part of my treatment plan, I agree to the following: COMMUNICATION: 
 
1. I will communicate fully with my provider about my medical condition(s), including the effect on my daily life and how well my medications are helping. I will tell my provider all of the medications that I take for any reason, including medications I receive from another health care provider, and will notify my provider about all issues, problems or concerns, including any side effects, which may be related to my medications. I understand that this information allows my provider to adjust my treatment plan to help manage my medical condition.  I understand that this information will become part of my permanent medical record. 2. I will notify my provider if I have a history of alcohol/drug misuse/addiction or if I have had treatment for alcohol/drug addiction in the past, or if I have a new problem with or concern about alcohol/drug use/addiction, because this increases the likelihood of high risk behaviors and may lead to serious medical conditions. 3. Females Only: I will notify my provider if I am or become pregnant, or if I intend to become pregnant, or if I intend to breastfeed. I understand that communication of these issues with my provider is important, due to possible effects my medication could have on an unborn fetus or breastfeeding child. Initials_____ Name:Laney Quezada Query :1975 MR #:<[id number]> [id number] [employer] Page 2 of 5 MISUSE OF MEDICATIONS / DRUGS: 
 
1. I agree to take all controlled substances as prescribed, and will not misuse or abuse any controlled substances prescribed by my provider. For my safety, I will not increase the amount of medicine I take without first talking with and getting permission from my provider. 2. If I have a medical emergency, another health care provider may prescribe me medication. If I seek emergency treatment, I will notify my provider within seventy-two (72) hours. 3. I understand that my provider may discuss my use and/or possible misuse/abuse of controlled substances and alcohol, as appropriate, with any health care provider involved in my care, pharmacist or legal authority. ILLEGAL DRUGS: 
 
1. I will not use illegal drugs of any kind, including but not limited to marijuana, heroin, cocaine, or any prescription drug which is not prescribed to me. DRUG DIVERSION / PRESCRIPTION FRAUD: 
 
1. I will not share, sell, trade, give away, or otherwise misuse my prescriptions or medications. 2. I will not alter any prescriptions provided to me by my provider. SINGLE PROVIDER: 
 
1. I agree that all controlled substances that I take will be prescribed only by my provider (or his/her covering provider) under this Agreement. This agreement does not prevent me from seeking emergency medical treatment or receiving pain management related to a surgery. PROTECTING MEDICATIONS: 
 
1. I am responsible for keeping my prescriptions and medications in a safe and secure place including safeguarding them from loss or theft. I understand that lost, stolen or damaged/destroyed prescriptions or medications will not be replaced. Initials____ Name:.Matthew Bower :1975 MR #:<[id number]> [id number] [employer] Page 3 of 5 PRESCRIPTION RENEWALS/REFILLS: 
 
1. I will follow my controlled substance medication schedule as prescribed by my provider. 2. I understand and agree that I will make any requests for renewals or refills of my prescriptions only at the time of an office visit or during my providers regular office hours subject to the prescription refill requirements of the individual practice. 3. I understand that my provider may not call in prescriptions for controlled substances to my pharmacy. 4. I understand that my provider may adjust or discontinue these medications as deemed appropriate for my medical treatment plan. This Agreement does not guarantee the prescription of controlled medications. 5. I agree that if my medications are adjusted or discontinued, I will properly dispose of any remaining medications. I understand that I will be required to dispose of any remaining controlled medications prior to being provided with any prescriptions for other controlled medications.  
 
6. I understand that the renewal of my prescription depends on my medical condition, my consistent participation, and my adherence with my treatment plan and this Agreement. 7. I understand that if I do not keep an appointment with my provider, I may not receive a renewal or refill for my controlled substance medication. PRESCRIPTION MONITORING / DRUG TESTIN. I understand that my provider may require me to provide urine, saliva or blood for testing at any time. I understand that this testing will be used to monitor for safety and adherence with my treatment plan and this Agreement. 2. I understand that my provider may ask me to provide an observed urine specimen, which means that a nurse or other health care provider may watch me provide urine, and I agree to cooperate if I am asked to provide an observed specimen. 3. I understand that if I do not provide urine, saliva or blood samples within two (2) hours of my providers request, or other timeframe decided by my provider, my treatment plan could be changed, or my prescriptions and medications may be changed or ended. 4. I understand that urine, saliva and blood test results will be a part of my permanent medical record. Initials_____ Name:.Matthew Wattshayy :1975 MR #:<[id number]> [id number] Page 4 of 5 
 
 
1. I authorize my provider and my pharmacy to cooperate fully with any local, state, or federal law enforcement agency in the investigation of any possible misuse, sale, or other diversion of my controlled substance prescriptions or medications. RISKS: 
 
1. I understand that my level of consciousness may be affected from the use of controlled substances, and I understand that there are risks, benefits, effects and potential alternatives (including no treatment) to the medications that my provider has prescribed. 2. I understand that I may become drowsy, tired, dizzy, constipated, and sick to my stomach, or have changes in my mood or in my sleep while taking my medications. I have talked with my provider about these possible side effects, risks, benefits, and alternative treatments, and my provider has answered all of my questions. 3. I understand that I should not suddenly stop taking my medications without first speaking with my provider. I understand that if I suddenly stop taking my medications, I may experience nausea, vomiting, sweating,anxiety, sleeplessness, itching or other uncomfortable feelings. 4. I will not take my medications with alcohol of any kind, including beer, wine or liquor. I understand that drinking alcohol with my medications increases the chances of side effects, including breathing problems or even death. 5. I understand that if I have a history of alcoholism or other drug addiction I may be at increased risk of addiction to my medications. Signs of addiction might include craving, compulsive use, and continued use despite harm. Since addiction is a disease, I understand my provider may decide to change my medications and refer me to appropriate treatment services. I understand that this information would become part of my permanent medical record. Initials_____ Name:Laney Bower :1975 MR #:<[id number]>  
 [id number] Page 5 of 5  
 
 
6. Females only: Children born to women who regularly take controlled substances are likely to have physical problems and suffer withdrawal symptoms at birth. If I am of child-bearing age, I understand that I should use safe and effective birth control while taking any controlled substances to avoid the impact of medications on an unborn fetus or  child. I agree to notify my provider immediately if I should become pregnant so that my treatment plan can be adjusted. 7. Males only: I understand that chronic use of controlled substances has been associated with low testosterone levels in males which may affect my mood, stamina, sexual desire, and general health. I understand that my provider may order the appropriate laboratory test to determine my testosterone level,and I agree to this testing. ADHERENCE: 
 
1. I understand that if I do not adhere to this Agreement in any way, my provider may change my prescriptions, stop prescribing controlled substances or end our provider-patient relationship. 2. If my provider decides to stop prescribing medication, or decides to end our provider-patient relationship,my provider may require that I taper my medications slowly. If necessary, my provider may also provide a prescription for other medications to treat my withdrawal symptoms. UNDERSTANDING THIS AGREEMENT: 
 
I understand that my provider may adjust or stop my prescriptions for controlled substances based on my medical condition and my treatment plan. I understand that this Agreement does not guarantee that I will be prescribed medications or controlled substances. I understand that controlled substances may be just one part 
of my treatment plan.  
 
My initial on each page and my signature below shows that I have read each page of this Agreement, I have had an opportunity to ask questions, and all of my questions have been answered to my satisfaction by my provider. By signing below, I agree to comply with this Agreement, and I understand that if I do not follow the Agreements listed above, my provider may stop 
 
_________________________________________  Date/Time 11/5/2019 9:48 AM   
             (Patient Signature) 
 
________________________________________    Date/Time 11/5/2019 9:48 AM 
 (Parent or Guardian Signature if <18 yrs) 
 
_________________________________________ Date/Time 11/5/2019 9:48 AM 
 (Provider Signature)

## 2019-11-05 NOTE — PROGRESS NOTES
Rebeca Mora presents today for   Chief Complaint   Patient presents with    Diabetes       Is someone accompanying this pt? NO    Is the patient using any DME equipment during OV? NO    Depression Screening:  3 most recent PHQ Screens 3/6/2019   PHQ Not Done Active Diagnosis of Depression or Bipolar Disorder   Little interest or pleasure in doing things Not at all   Feeling down, depressed, irritable, or hopeless Not at all   Total Score PHQ 2 0       Learning Assessment:  No flowsheet data found. Abuse Screening:  No flowsheet data found. Fall Risk  No flowsheet data found. Health Maintenance reviewed and discussed and ordered per Provider. Health Maintenance Due   Topic Date Due    Pneumococcal 0-64 years (1 of 3 - PCV13) 03/27/1982    FOOT EXAM Q1  03/27/1986    MICROALBUMIN Q1  03/27/1986    EYE EXAM RETINAL OR DILATED  03/27/1986    DTaP/Tdap/Td series (1 - Tdap) 03/27/1997    LIPID PANEL Q1  12/10/2018    MEDICARE YEARLY EXAM  03/06/2019    HEMOGLOBIN A1C Q6M  06/30/2019    Influenza Age 9 to Adult  08/01/2019   . Coordination of Care:  1. Have you been to the ER, urgent care clinic since your last visit? Hospitalized since your last visit? No    2. Have you seen or consulted any other health care providers outside of the [address] [place] [address] since your last visit? Include any pap smears or colon screening.  No      Last  Checked Unknown  Last UDS Checked none  Last Pain contract signed: none

## 2019-11-05 NOTE — PROGRESS NOTES
Follow Up Visit Note    Chief Complaint   Patient presents with    Diabetes       HPI:  Candace Hebert is a [place] 47 y.o.  male  has a past medical history of Anemia, Anxiety, Autoimmune disease ([facility] [address] Chronic kidney disease, Clostridium difficile infection, Depression, Diabetes ([address] Diabetic foot ulcer ([facility] [address] (03/15/2018), Dialysis patient [facility]), Generalized headaches, GERD (gastroesophageal reflux disease), Headache, Heart attack ([address] Heart disease, HIV (human immunodeficiency virus infection) ([address] HTN (hypertension), Infection of AV graft for dialysis ([facility] [address] Lymphocele, MI (myocardial infarction) ([facility] [address] MRSA (methicillin resistant Staphylococcus aureus), and Vision decreased. is here for the above complaint(s). Right ear pain- resolved    HIV-   Patient states that he did not see infectious disease as scheduled. They canceled his appointment secondary to the physician having an emergency. He states that he is following up with infectious disease at the end of the month. Nephrology - patient sees nephrologist twice a month. He continues to have renal dialysis. Patient states that he is only urinating 1-2 times a day. DM-2  Key Antihyperglycemic Medications             insulin glargine (LANTUS) 100 unit/mL injection (Taking) 10 Units by SubCUTAneous route nightly. insulin lispro (HUMALOG) 100 unit/mL injection (Taking) 5 Units by SubCUTAneous route three (3) times daily (after meals). insulin lispro (HUMALOG U-100 INSULIN) 100 unit/mL injection 5 Units by SubCUTAneous route three (3) times daily (with meals). insulin glargine (LANTUS U-100 INSULIN) 100 unit/mL injection 10 Units by SubCUTAneous route nightly.           Lab Results   Component Value Date/Time    Hemoglobin A1c 9.2 (H) 12/31/2018 04:17 AM     Due for eye exam- goes to [facility]  medication compliance: compliant all of the time, diabetic diet compliance: compliant most of the time, home glucose monitoring: is sporadically performed. Fasting- 130-150's. Post prandial - 97 (yesterday). Hypertension  Patient is currently taking   Key CAD CHF Meds             carvedilol (COREG) 25 mg tablet (Taking) Take 1 Tab by mouth two (2) times daily (with meals). losartan (COZAAR) 50 mg tablet (Taking) Take 1 Tab by mouth daily. amLODIPine (NORVASC) 10 mg tablet (Taking) Take 1 Tab by mouth daily. aspirin 81 mg chewable tablet (Taking) Take 1 Tab by mouth daily. carvedilol (COREG) 25 mg tablet Take 1 Tab by mouth daily. losartan (COZAAR) 50 mg tablet Take 1 Tab by mouth daily. atorvastatin (LIPITOR) 40 mg tablet Take 1 Tab by mouth nightly. . BP today is   BP Readings from Last 1 Encounters:   11/05/19 152/90     Patient states that he has not taken his medication this morning. He has not taken his medications in the last 2 days because he was not at home. He traveled to [place] and has not been home. Patient has been checking his BP at home. Patient states that he is getting -140/80's. Patient does follow low salt diet. Patient is not currently exercising. Patient denies chest pain, shortness of breath, palpitations, lower extremity edema, dizziness/lightheadedness or syncopal episodes. Key CAD CHF Meds             carvedilol (COREG) 25 mg tablet (Taking) Take 1 Tab by mouth two (2) times daily (with meals). losartan (COZAAR) 50 mg tablet (Taking) Take 1 Tab by mouth daily. amLODIPine (NORVASC) 10 mg tablet (Taking) Take 1 Tab by mouth daily. aspirin 81 mg chewable tablet (Taking) Take 1 Tab by mouth daily. carvedilol (COREG) 25 mg tablet Take 1 Tab by mouth daily. losartan (COZAAR) 50 mg tablet Take 1 Tab by mouth daily. atorvastatin (LIPITOR) 40 mg tablet Take 1 Tab by mouth nightly. Elevated Lipids    Key CAD CHF Meds             carvedilol (COREG) 25 mg tablet (Taking) Take 1 Tab by mouth two (2) times daily (with meals).     losartan (COZAAR) 50 mg tablet (Taking) Take 1 Tab by mouth daily. amLODIPine (NORVASC) 10 mg tablet (Taking) Take 1 Tab by mouth daily. aspirin 81 mg chewable tablet (Taking) Take 1 Tab by mouth daily. carvedilol (COREG) 25 mg tablet Take 1 Tab by mouth daily. losartan (COZAAR) 50 mg tablet Take 1 Tab by mouth daily. atorvastatin (LIPITOR) 40 mg tablet Take 1 Tab by mouth nightly. Lab Results   Component Value Date/Time    Cholesterol, total 176 12/10/2017 03:04 AM    HDL Cholesterol 41 12/10/2017 03:04 AM    LDL, calculated 83 12/10/2017 03:04 AM    Triglyceride 260 (H) 12/10/2017 03:04 AM    CHOL/HDL Ratio 4.3 12/10/2017 03:04 AM         Obesity    Wt Readings from Last 5 Encounters:   11/05/19 247 lb (112 kg)   10/03/19 244 lb (110.7 kg)   09/27/19 255 lb (115.7 kg)   07/26/19 255 lb (115.7 kg)   03/26/19 251 lb (113.9 kg)           Review of Systems   Constitutional: Negative for chills, fever, malaise/fatigue and weight loss. Eyes: Negative for blurred vision, double vision and pain. Respiratory: Negative for cough, sputum production, shortness of breath and wheezing. Cardiovascular: Negative for chest pain, palpitations, orthopnea, claudication and leg swelling. Gastrointestinal: Negative for abdominal pain, constipation, diarrhea, nausea and vomiting. Genitourinary: Negative for dysuria, frequency and urgency. Neurological: Negative for dizziness, tingling and headaches. Current Outpatient Medications   Medication Sig    ondansetron (ZOFRAN ODT) 4 mg disintegrating tablet Take 1 Tab by mouth every eight (8) hours as needed for Nausea.  polyethylene glycol (MIRALAX) 17 gram/dose powder Take 17 g by mouth daily.  atazanavir (REYATAZ) 300 mg capsule Take 300 mg by mouth Daily (before breakfast).  FLUoxetine (PROZAC) 20 mg capsule Take 1 Cap by mouth daily.  calcium carbonate (TUMS) 200 mg calcium (500 mg) chew Take 1 Tab by mouth as needed.     dolutegravir (TIVICAY) 50 mg tab tablet Take 1 Tab by mouth daily.  abacavir (ZIAGEN) 300 mg tablet Take 2 Tabs by mouth daily. Indications: HIV infection    carvedilol (COREG) 25 mg tablet Take 1 Tab by mouth two (2) times daily (with meals).  gabapentin (NEURONTIN) 100 mg capsule Take 1 Cap by mouth three (3) times daily. Indications: NEUROPATHIC PAIN    insulin glargine (LANTUS) 100 unit/mL injection 10 Units by SubCUTAneous route nightly. (Patient taking differently: 25 Units by SubCUTAneous route nightly.)    b complex-vitamin c-folic acid (NEPHROCAPS) 1 mg capsule Take 1 Cap by mouth daily.  buPROPion SR (WELLBUTRIN SR) 150 mg SR tablet Take 1 Tab by mouth two (2) times a day.  losartan (COZAAR) 50 mg tablet Take 1 Tab by mouth daily.  insulin lispro (HUMALOG) 100 unit/mL injection 5 Units by SubCUTAneous route three (3) times daily (after meals). (Patient taking differently: 5 Units by SubCUTAneous route Before breakfast, lunch, and dinner.)    amLODIPine (NORVASC) 10 mg tablet Take 1 Tab by mouth daily.  ergocalciferol (ERGOCALCIFEROL) 50,000 unit capsule Take 1 Cap by mouth every seven (7) days. (Patient taking differently: Take 10,000 Units by mouth daily. Indications: Pt stated he takes on Sunday)    aspirin 81 mg chewable tablet Take 1 Tab by mouth daily.  fluticasone propionate (FLONASE) 50 mcg/actuation nasal spray 2 Sprays by Both Nostrils route daily.  carvedilol (COREG) 25 mg tablet Take 1 Tab by mouth daily.  sevelamer carbonate (RENVELA) 800 mg tab tab Take 1 Tab by mouth daily.  traZODone (DESYREL) 50 mg tablet Take  by mouth nightly.  buPROPion SR (WELLBUTRIN SR) 150 mg SR tablet Take  by mouth two (2) times a day.  promethazine (PHENERGAN) 12.5 mg tablet Take  by mouth every six (6) hours as needed for Nausea.  ritonavir (NORVIR) 100 mg tablet Take  by mouth two (2) times daily (with meals).  dolutegravir (TIVICAY) 50 mg tab tablet Take  by mouth.     losartan (COZAAR) 50 mg tablet Take 1 Tab by mouth daily.  insulin lispro (HUMALOG U-100 INSULIN) 100 unit/mL injection 5 Units by SubCUTAneous route three (3) times daily (with meals).  insulin glargine (LANTUS U-100 INSULIN) 100 unit/mL injection 10 Units by SubCUTAneous route nightly.  gabapentin (NEURONTIN) 100 mg capsule Take 1 Cap by mouth three (3) times daily.  sevelamer carbonate (RENVELA) 800 mg tab tab Take  by mouth daily.  omeprazole (PRILOSEC) 20 mg capsule Take 20 mg by mouth daily.  lamiVUDine (EPIVIR) 10 mg/mL solution Take 2.5 mL by mouth daily. Indications: HIV infection    atorvastatin (LIPITOR) 40 mg tablet Take 1 Tab by mouth nightly.  FLUoxetine (PROZAC) 10 mg capsule Take 20 mg by mouth daily. No current facility-administered medications for this visit. Health Maintenance   Topic Date Due    Pneumococcal 0-64 years (1 of 3 - PCV13) 03/27/1982    FOOT EXAM Q1  03/27/1986    MICROALBUMIN Q1  03/27/1986    EYE EXAM RETINAL OR DILATED  03/27/1986    DTaP/Tdap/Td series (1 - Tdap) 03/27/1997    LIPID PANEL Q1  12/10/2018    MEDICARE YEARLY EXAM  03/06/2019    HEMOGLOBIN A1C Q6M  06/30/2019    Influenza Age 9 to Adult  08/01/2019     Immunization History   Administered Date(s) Administered    Influenza Vaccine 10/01/2017    Influenza Vaccine (Quad) PF 09/24/2016       Allergies and Medications: Reviewed and updated in EMR.      Past Medical History:   Diagnosis Date    Anemia     Anxiety     [facility]     HIV    Chronic kidney disease     Clostridium difficile infection     Depression     Diabetes ([facility] [address]     Diabetic foot ulcer ([facility] [address] 03/15/2018    LEFT    Dialysis patient ([facility] [address]     MWF    Generalized headaches     GERD (gastroesophageal reflux disease)     Headache     Heart attack ([facility] [address]     Heart disease     HIV (human immunodeficiency virus infection) ([facility] [address]     HTN (hypertension)     Infection of AV graft for dialysis ([facility] [address]     Lymphocele     MI (myocardial infarction) ([facility] [address]     MI    MRSA (methicillin resistant Staphylococcus aureus)     Vision decreased        Surgical History: Reviewed and updated in EMR as appropriate. Social History: Reviewed and updated in EMR as appropriate. Family History: Reviewed and updated in EMR as appropriate. OBJECTIVE:   Visit Vitals  /90   Pulse 91   Temp 97.3 °F (36.3 °C) (Oral)   Resp 18   Ht 5' 8\" (1.727 m)   Wt 247 lb (112 kg)   SpO2 100%   BMI 37.56 kg/m²        Physical Exam   Constitutional: He is oriented to person, place, and time and well-developed, well-nourished, and in no distress. No distress. Neck: Normal range of motion. Neck supple. No thyromegaly present. Cardiovascular: Normal rate, regular rhythm, normal heart sounds and intact distal pulses. Exam reveals no gallop and no friction rub. No murmur heard. Pulmonary/Chest: Effort normal and breath sounds normal. No respiratory distress. He has no wheezes. He has no rales. He exhibits no tenderness. Lymphadenopathy:     He has no cervical adenopathy. Neurological: He is alert and oriented to person, place, and time. Skin: Skin is warm and dry. He is not diaphoretic. Psychiatric: Mood, memory, affect and judgment normal.   Nursing note and vitals reviewed.       LABS/RADIOLOGICAL TESTS:  Lab Results   Component Value Date/Time    WBC 5.3 08/28/2019 01:00 PM    HGB 11.1 (L) 08/28/2019 01:00 PM    HCT 36.5 (L) 08/28/2019 01:00 PM    PLATELET 450 10/81/0418 01:00 PM     Lab Results   Component Value Date/Time    Sodium 135 (L) 08/28/2019 01:00 PM    Potassium 5.5 (H) 08/28/2019 01:00 PM    Chloride 101 08/28/2019 01:00 PM    CO2 24 08/28/2019 01:00 PM    Glucose 222 (H) 08/28/2019 01:00 PM    BUN 75 (H) 08/28/2019 01:00 PM    Creatinine 14.9 (H) 08/28/2019 01:00 PM     Lab Results   Component Value Date/Time    Cholesterol, total 176 12/10/2017 03:04 AM    HDL Cholesterol 41 12/10/2017 03:04 AM    LDL, calculated 83 12/10/2017 03:04 AM Triglyceride 260 (H) 12/10/2017 03:04 AM     No results found for: GPT  Lab Results   Component Value Date/Time    Hemoglobin A1c 9.2 (H) 12/31/2018 04:17 AM         All lab results and radiological studies were reviewed and discussed with the patient. ASSESSMENT/PLAN:    Diagnoses and all orders for this visit:    1. ESRD (end stage renal disease) ([facility] [address]  Continue with renal dialysis as prescribed. Follow-up with nephrology as scheduled. .   2. Type 2 diabetes mellitus with diabetic neuropathy, with long-term current use of insulin (HCC)  -     LIPID PANEL; Future  -     HEMOGLOBIN A1C WITH EAG; Future  Continue with current management. Follow-up with endocrinology as prescribed. Discussed with patient that he is due for a diabetic eye exam advised patient to schedule. Gave patient referrals to ophthalmology to schedule this appointment. Key Antihyperglycemic Medications             insulin glargine (LANTUS) 100 unit/mL injection (Taking) 10 Units by SubCUTAneous route nightly. insulin lispro (HUMALOG) 100 unit/mL injection (Taking) 5 Units by SubCUTAneous route three (3) times daily (after meals). insulin lispro (HUMALOG U-100 INSULIN) 100 unit/mL injection 5 Units by SubCUTAneous route three (3) times daily (with meals). insulin glargine (LANTUS U-100 INSULIN) 100 unit/mL injection 10 Units by SubCUTAneous route nightly. 1)   Goal HBA1C < 7.  2)   Post prandial blood sugar less than or equal to 180.  3)   Exercise 30 min 3-5 times a week for goal BMI of 25.  4)   Please monitor blood sugars as directed and keep a log to bring in with you at each visit. 5)   Diabetic diet discussed and patient instructions to be handed out. 6)   Goal LDL< 100  7)   Goal BP< 120/80 mmhg. 8)   Annual eye exam and foot exam.  9)   Please examine you feet daily for any skin breakages or ulcers. 10) Referral made to see nutritionist for better dietary guidance.   11) Continue current medications as prescribed. 12) Explained the side effects of medication and patient verbalized understanding. 13) Please avoid smoking , alcohol and illicit drug use if applicable to you.  14) Please have blood work ordered today completed. 15) Please make sure you schedule your routine medical exam every 1-2 years    4. HIV infection, unspecified symptom status ([facility] [address]  Follow-up with infectious disease as scheduled. \    5. Essential Hypertension  Blood pressure uncontrolled today. Patient states that he has not taken his medications in the last 2 days. He states that he went to [place] and forgot to bring his medications with him. Reports he is not taking them this morning as of yet. Blood pressure was controlled at last visit. We will continue to monitor. Continue current management. Key CAD CHF Meds             carvedilol (COREG) 25 mg tablet (Taking) Take 1 Tab by mouth two (2) times daily (with meals). losartan (COZAAR) 50 mg tablet (Taking) Take 1 Tab by mouth daily. amLODIPine (NORVASC) 10 mg tablet (Taking) Take 1 Tab by mouth daily. aspirin 81 mg chewable tablet (Taking) Take 1 Tab by mouth daily. carvedilol (COREG) 25 mg tablet Take 1 Tab by mouth daily. losartan (COZAAR) 50 mg tablet Take 1 Tab by mouth daily. atorvastatin (LIPITOR) 40 mg tablet Take 1 Tab by mouth nightly. 1) Goal blood pressure less than equal to 140/90 mmHg, goal BP can vary depending on risk factors as discussed. 2) Lifestyle modifications discussed with patient, low sodium <2 gm, low salt , DASH diet  3) Exercise for at least 30 min 3-5 times a week for goal BMI of less than or equal  To 25.  4) Continue current medications as prescribed. 5) Please begin medication as discussed for better blood pressure control, explained side effects and patient verbalized understanding. 6) Goal LDL<100.  7) Please monitor your blood pressure and keep a log to bring in with you at each visit.   8) Discussed risk factors with patient such as CAD, FAST of stroke symptoms, pt verbalizes understanding. 9) Please avoid smoking , alcohol and any illicit drug use if applicable to you. 10) Discussed lifestyle modifications ,dietary control and BP monitoring at home       Patient verbalized understanding and agreement with the plan. Patient was given an after-visit summary. Follow-up In 1-2 months for [facility] or sooner if worsening symptoms. More than 50% of this 25 min visit was spent counseling the patient face to face about etiology and treatment of health conditions outlined in assessment and plan        Lexy Martinez PA-C  [address], [address] [place] [address], [address] [place], [place] [id number] [address] [place] [id number] [phone]

## 2019-12-16 ENCOUNTER — OFFICE VISIT (OUTPATIENT)
Dept: FAMILY MEDICINE CLINIC | Age: 44
End: 2019-12-16

## 2019-12-16 VITALS
SYSTOLIC BLOOD PRESSURE: 210 MMHG | BODY MASS INDEX: 38.49 KG/M2 | WEIGHT: 254 LBS | HEART RATE: 105 BPM | RESPIRATION RATE: 18 BRPM | DIASTOLIC BLOOD PRESSURE: 110 MMHG | TEMPERATURE: 98 F | HEIGHT: 68 IN | OXYGEN SATURATION: 96 %

## 2019-12-16 DIAGNOSIS — Z00.00 MEDICARE WELCOME EXAM: Primary | ICD-10-CM

## 2019-12-16 PROBLEM — L97.509 DIABETIC FOOT ULCER (HCC): Status: ACTIVE | Noted: 2018-03-15

## 2019-12-16 PROBLEM — E11.621 DIABETIC FOOT ULCER (HCC): Status: ACTIVE | Noted: 2018-03-15

## 2019-12-16 NOTE — PROGRESS NOTES
(AWV) The Initial Medicare Annual Wellness Exam PROGRESS NOTE    This is an Initial Medicare Annual Wellness Exam (AWV) (Performed 12 months after IPPE or effective date of Medicare Part B enrollment, Once in a lifetime)    I have reviewed the patient's medical history in detail and updated the computerized patient record. Nancy Yuan is a 38 y.o.  male and presents for an annual wellness exam       Patient Active Problem List   Diagnosis Code    HIV (human immunodeficiency virus infection) ([facility] B20    Heart disease I51.9    Heart attack ([facility] I21.9    Generalized headaches R51    Diabetes ([facility] E11.9    Depression F32.9    Chronic kidney disease N18.9    Severe obesity ([facility] E66.01    Proteinuria R80.9    HIV (human immunodeficiency virus infection) ([facility] B20    Myocardial infarct ([facility] I21.9    Elevated troponin R79.89    Paroxysmal tachycardia, unspecified (HCC) I47.9    TIA (transient ischemic attack) G45.9    Chest pressure R07.89    ESRD (end stage renal disease) ([facility] N18.6    Suicidal ideation R45.851    End stage renal failure on dialysis ([facility] N18.6, Z99.2    Sepsis ([facility] A41.9    Clostridium difficile colitis A04.72    Infected ulcer of skin ([facility] L98.499, L08.9    Type 2 diabetes with nephropathy ([facility] E11.21    Type 2 diabetes mellitus with diabetic neuropathy (HCC) E11.40     Current Outpatient Medications   Medication Sig Dispense Refill    ondansetron (ZOFRAN ODT) 4 mg disintegrating tablet Take 1 Tab by mouth every eight (8) hours as needed for Nausea. 20 Tab 0    polyethylene glycol (MIRALAX) 17 gram/dose powder Take 17 g by mouth daily. 510 g 0    atazanavir (REYATAZ) 300 mg capsule Take 300 mg by mouth Daily (before breakfast).  traZODone (DESYREL) 50 mg tablet Take  by mouth nightly.  buPROPion SR (WELLBUTRIN SR) 150 mg SR tablet Take  by mouth two (2) times a day.       ritonavir (NORVIR) 100 mg tablet Take  by mouth two (2) times daily (with meals).  FLUoxetine (PROZAC) 20 mg capsule Take 1 Cap by mouth daily. 90 Cap 0    insulin lispro (HUMALOG U-100 INSULIN) 100 unit/mL injection 5 Units by SubCUTAneous route three (3) times daily (with meals). 1 Vial 4    insulin glargine (LANTUS U-100 INSULIN) 100 unit/mL injection 10 Units by SubCUTAneous route nightly. 1 Vial 4    sevelamer carbonate (RENVELA) 800 mg tab tab Take  by mouth daily.  calcium carbonate (TUMS) 200 mg calcium (500 mg) chew Take 1 Tab by mouth as needed.  omeprazole (PRILOSEC) 20 mg capsule Take 20 mg by mouth daily.  dolutegravir (TIVICAY) 50 mg tab tablet Take 1 Tab by mouth daily. 90 Tab 3    abacavir (ZIAGEN) 300 mg tablet Take 2 Tabs by mouth daily. Indications: HIV infection 180 Tab 3    lamiVUDine (EPIVIR) 10 mg/mL solution Take 2.5 mL by mouth daily. Indications: HIV infection 240 mL 3    carvedilol (COREG) 25 mg tablet Take 1 Tab by mouth two (2) times daily (with meals). 60 Tab 0    gabapentin (NEURONTIN) 100 mg capsule Take 1 Cap by mouth three (3) times daily. Indications: NEUROPATHIC PAIN 90 Cap 1    b complex-vitamin c-folic acid (NEPHROCAPS) 1 mg capsule Take 1 Cap by mouth daily. 30 Cap 1    buPROPion SR (WELLBUTRIN SR) 150 mg SR tablet Take 1 Tab by mouth two (2) times a day. 60 Tab 1    losartan (COZAAR) 50 mg tablet Take 1 Tab by mouth daily. 30 Tab 1    amLODIPine (NORVASC) 10 mg tablet Take 1 Tab by mouth daily. 30 Tab 3    atorvastatin (LIPITOR) 40 mg tablet Take 1 Tab by mouth nightly. 30 Tab 3    ergocalciferol (ERGOCALCIFEROL) 50,000 unit capsule Take 1 Cap by mouth every seven (7) days. (Patient taking differently: Take 10,000 Units by mouth daily. Indications: Pt stated he takes on Sunday) 4 Cap 3    aspirin 81 mg chewable tablet Take 1 Tab by mouth daily. 30 Tab 0    fluticasone propionate (FLONASE) 50 mcg/actuation nasal spray 2 Sprays by Both Nostrils route daily.  1 Bottle 0    carvedilol (COREG) 25 mg tablet Take 1 Tab by mouth daily. 1    sevelamer carbonate (RENVELA) 800 mg tab tab Take 1 Tab by mouth daily.  promethazine (PHENERGAN) 12.5 mg tablet Take  by mouth every six (6) hours as needed for Nausea.  dolutegravir (TIVICAY) 50 mg tab tablet Take  by mouth.  losartan (COZAAR) 50 mg tablet Take 1 Tab by mouth daily. 90 Tab 0    gabapentin (NEURONTIN) 100 mg capsule Take 1 Cap by mouth three (3) times daily. 180 Cap 0    insulin glargine (LANTUS) 100 unit/mL injection 10 Units by SubCUTAneous route nightly. (Patient taking differently: 25 Units by SubCUTAneous route nightly.) 1 Vial 3    insulin lispro (HUMALOG) 100 unit/mL injection 5 Units by SubCUTAneous route three (3) times daily (after meals). (Patient taking differently: 5 Units by SubCUTAneous route Before breakfast, lunch, and dinner.) 1 Vial 0    FLUoxetine (PROZAC) 10 mg capsule Take 20 mg by mouth daily.        Allergies   Allergen Reactions    Mylanta [Aluminum-Magnesium Hydroxide] Hives    Simethicone Hives     Past Medical History:   Diagnosis Date    Anemia     Anxiety     Autoimmune disease [facility]     HIV    Chronic kidney disease     Clostridium difficile infection     Depression     Diabetes ([facility]     Diabetic foot ulcer ([facility] 03/15/2018    LEFT    Dialysis patient ([facility]     MWF    Generalized headaches     GERD (gastroesophageal reflux disease)     Headache     Heart attack ([facility]     Heart disease     HIV (human immunodeficiency virus infection) ([facility]     HTN (hypertension)     Infection of AV graft for dialysis ([facility]     Lymphocele     MI (myocardial infarction) ([facility]     MI    MRSA (methicillin resistant Staphylococcus aureus)     Vision decreased      Past Surgical History:   Procedure Laterality Date    CARDIAC CATHETERIZATION  9/10/2016         CARDIAC SURG PROCEDURE UNLIST      stent    HX ARTERIOVENOUS GRAFT      HX CYST INCISION AND DRAINAGE      Abscess removal    HX ORTHOPAEDIC      Left foot    HX VASCULAR ACCESS Left 01/2018    TDC     Family History   Problem Relation Age of Onset    Attention Deficit Hyperactivity Disorder Mother     Hypertension Mother     Elevated Lipids Mother     Diabetes Mother      Social History     Tobacco Use    Smoking status: Current Every Day Smoker     Years: 0.00    Smokeless tobacco: Never Used    Tobacco comment: 7 cigarettes a day   Substance Use Topics    Alcohol use: No     Frequency: Never       ROS    All other systems reviewed and are negative. History     Past Medical History:   Diagnosis Date    Anemia     Anxiety     Autoimmune disease ([facility]     HIV    Chronic kidney disease     Clostridium difficile infection     Depression     [facility]     [facility] 03/15/2018    LEFT    [facility]     M[facility]    Generalized headaches     GERD (gastroesophageal reflux disease)     Headache     [facility]     Heart disease     [facility] immunodeficiency [facility]     HTN (hypertension)     [facility] graft [facility]     Lymphocele     [facility]) [facility]     MI    MRSA (methicillin resistant Staphylococcus aureus)     Vision decreased       Past Surgical History:   Procedure Laterality Date    CARDIAC CATHETERIZATION  9/10/2016         CARDIAC SURG PROCEDURE UNLIST      stent    HX ARTERIOVENOUS GRAFT      HX CYST INCISION AND DRAINAGE      Abscess removal    HX ORTHOPAEDIC      Left foot    HX VASCULAR ACCESS Left 01/2018    TDC     Current Outpatient Medications   Medication Sig Dispense Refill    ondansetron (ZOFRAN ODT) 4 mg disintegrating tablet Take 1 Tab by mouth every eight (8) hours as needed for Nausea. 20 Tab 0    polyethylene glycol (MIRALAX) 17 gram/dose powder Take 17 g by mouth daily. 510 g 0    atazanavir (REYATAZ) 300 mg capsule Take 300 mg by mouth Daily (before breakfast).       traZODone (DESYREL) 50 mg tablet Take  by mouth nightly.  buPROPion SR (WELLBUTRIN SR) 150 mg SR tablet Take  by mouth two (2) times a day.  ritonavir (NORVIR) 100 mg tablet Take  by mouth two (2) times daily (with meals).  FLUoxetine (PROZAC) 20 mg capsule Take 1 Cap by mouth daily. 90 Cap 0    insulin lispro (HUMALOG U-100 INSULIN) 100 unit/mL injection 5 Units by SubCUTAneous route three (3) times daily (with meals). 1 Vial 4    insulin glargine (LANTUS U-100 INSULIN) 100 unit/mL injection 10 Units by SubCUTAneous route nightly. 1 Vial 4    sevelamer carbonate (RENVELA) 800 mg tab tab Take  by mouth daily.  calcium carbonate (TUMS) 200 mg calcium (500 mg) chew Take 1 Tab by mouth as needed.  omeprazole (PRILOSEC) 20 mg capsule Take 20 mg by mouth daily.  dolutegravir (TIVICAY) 50 mg tab tablet Take 1 Tab by mouth daily. 90 Tab 3    abacavir (ZIAGEN) 300 mg tablet Take 2 Tabs by mouth daily. Indications: HIV infection 180 Tab 3    lamiVUDine (EPIVIR) 10 mg/mL solution Take 2.5 mL by mouth daily. Indications: HIV infection 240 mL 3    carvedilol (COREG) 25 mg tablet Take 1 Tab by mouth two (2) times daily (with meals). 60 Tab 0    gabapentin (NEURONTIN) 100 mg capsule Take 1 Cap by mouth three (3) times daily. Indications: NEUROPATHIC PAIN 90 Cap 1    b complex-vitamin c-folic acid (NEPHROCAPS) 1 mg capsule Take 1 Cap by mouth daily. 30 Cap 1    buPROPion SR (WELLBUTRIN SR) 150 mg SR tablet Take 1 Tab by mouth two (2) times a day. 60 Tab 1    losartan (COZAAR) 50 mg tablet Take 1 Tab by mouth daily. 30 Tab 1    amLODIPine (NORVASC) 10 mg tablet Take 1 Tab by mouth daily. 30 Tab 3    atorvastatin (LIPITOR) 40 mg tablet Take 1 Tab by mouth nightly. 30 Tab 3    ergocalciferol (ERGOCALCIFEROL) 50,000 unit capsule Take 1 Cap by mouth every seven (7) days. (Patient taking differently: Take 10,000 Units by mouth daily.  Indications: Pt stated he takes on Sunday) 4 Cap 3    aspirin 81 mg chewable tablet Take 1 Tab by mouth daily. 30 Tab 0    fluticasone propionate (FLONASE) 50 mcg/actuation nasal spray 2 Sprays by Both Nostrils route daily. 1 Bottle 0    carvedilol (COREG) 25 mg tablet Take 1 Tab by mouth daily. 1    sevelamer carbonate (RENVELA) 800 mg tab tab Take 1 Tab by mouth daily.  promethazine (PHENERGAN) 12.5 mg tablet Take  by mouth every six (6) hours as needed for Nausea.  dolutegravir (TIVICAY) 50 mg tab tablet Take  by mouth.  losartan (COZAAR) 50 mg tablet Take 1 Tab by mouth daily. 90 Tab 0    gabapentin (NEURONTIN) 100 mg capsule Take 1 Cap by mouth three (3) times daily. 180 Cap 0    insulin glargine (LANTUS) 100 unit/mL injection 10 Units by SubCUTAneous route nightly. (Patient taking differently: 25 Units by SubCUTAneous route nightly.) 1 Vial 3    insulin lispro (HUMALOG) 100 unit/mL injection 5 Units by SubCUTAneous route three (3) times daily (after meals). (Patient taking differently: 5 Units by SubCUTAneous route Before breakfast, lunch, and dinner.) 1 Vial 0    FLUoxetine (PROZAC) 10 mg capsule Take 20 mg by mouth daily.        Allergies   Allergen Reactions    Mylanta [Aluminum-Magnesium Hydroxide] Hives    Simethicone Hives     Family History   Problem Relation Age of Onset    Attention Deficit Hyperactivity Disorder Mother     Hypertension Mother    [de-identified] Elevated Lipids Mother     Diabetes Mother      Social History     Tobacco Use    Smoking status: Current Every Day Smoker     Years: 0.00    Smokeless tobacco: Never Used    Tobacco comment: 7 cigarettes a day   Substance Use Topics    Alcohol use: No     Frequency: Never     Patient Active Problem List   Diagnosis Code    [facility] B[facility]    Heart disease I51.9    Heart attack ([facility] I21.9    Generalized headaches R51    Diabetes ([facility] E11.9    Depression F32.9    Chronic kidney disease N18.9    Severe obesity ([facility] E66.01    Proteinuria R80.9    HIV (human immunodeficiency virus infection) ([facility] B20    Myocardial infarct ([facility] I21.9    Elevated troponin R79.89    Paroxysmal tachycardia, unspecified (HCC) I47.9    TIA (transient ischemic attack) G45.9    Chest pressure R07.89    ESRD (end stage renal disease) ([facility] N18.6    Suicidal ideation R45.851    End stage renal failure on dialysis (HCC) N18.6, Z99.2    Sepsis ([facility]) A41.9    Clostridium difficile colitis A04.72    Infected ulcer of skin ([facility] L98.499, L08.9    Type 2 diabetes with nephropathy ([facility] E11.21    Type 2 diabetes mellitus with diabetic neuropathy ([facility] E11.40       Health Maintenance History  Immunizations reviewed   Eye exam: 1/2019      Depression Risk Factor Screening:      Patient Health Questionnaire (PHQ-2)   Over the last 2 weeks, how often have you been bothered by any of the following problems? · Little interest or pleasure in doing things? · Several days. [1]  · Feeling down, depressed, or hopeless? · Several days. [1]    Total Score: 2/6  PHQ-2 Assessment Scoring:   A score of 2 or more requires further screening with the PHQ-9    Alcohol Risk Factor Screening:     Women: On any occasion during the past 3 months, have you had more than 3 drinks containing alcohol? Do you average more than 7 drinks per week? Men: On any occasion during the past 3 months, have you had more than 4 drinks containing alcohol? no  Do you average more than 14 drinks per week? no    Functional Ability and Level of Safety:     Hearing Loss    Hearing is good. Activities of Daily Living   Self-care. Requires assistance with: no ADLs    Fall Risk   No fall risk factors    Abuse Screen   None    Examination   Physical Examination  Vitals:    12/16/19 0837   BP: (!) 220/120   Pulse: (!) 105   Resp: 18   Temp: 98 °F (36.7 °C)   TempSrc: Oral   SpO2: 96%   Weight: 254 lb (115.2 kg)   Height: 5' 8\" (1.727 m)   PainSc:   4   PainLoc: Foot      Body mass index is 38.62 kg/m².      Evaluation of Cognitive Function:  Mood/affect: WNL  Appearance: WNL  Family member/caregiver input: NONE    alert, well appearing, and in no distress and oriented to person, place, and time    Patient Care Team:  Narcisa Cornelius PA-C as PCP - General (Physician Assistant)  Narcisa Cornelius PA-C as PCP - [facility] Empaneled Provider  UNKNOWN    End-of-life planning  Advanced Directive in the case than an injury or illness causes the patient to be unable to make health care decisions    Health Care Directive or Living Will: no    Advice/Referrals/Counselling/Plan:   Education and counseling provided:  Are appropriate based on today's review and evaluation  Include in education list (weight loss, physical activity, smoking cessation, fall prevention, and nutrition)    ICD-10-CM ICD-9-CM    1. Medicare welcome exam Z00.00 V70.0 AMB POC EKG ROUTINE W/ 12 LEADS, INTER & REP     Encounter Diagnoses   Name Primary?  Medicare welcome exam Yes   . Brief written plan, checklist    I have discussed the diagnosis with the patient and the intended plan as seen in the above orders. The patient has received an after-visit summary and questions were answered concerning future plans. I have discussed medication side effects and warnings with the patient as well. I have reviewed the plan of care with the patient, accepted their input and they are in agreement with the treatment goals. ____________________________________________________________    Problem Assessment    for treatment of   Chief Complaint   Patient presents with    Welcome To Medicare         SUBJECTIVE    Hypertension  Patient is here for follow-up of hypertension. He indicates that he is feeling well and denies any symptoms referable to his hypertension. He is not exercising and is not adherent to low salt diet. Blood pressure is well controlled at home. Use of agents associated with hypertension: none.     Hypertension  Patient is currently taking   Key CAD CHF Meds carvedilol (COREG) 25 mg tablet (Taking) Take 1 Tab by mouth two (2) times daily (with meals). losartan (COZAAR) 50 mg tablet (Taking) Take 1 Tab by mouth daily. amLODIPine (NORVASC) 10 mg tablet (Taking) Take 1 Tab by mouth daily. atorvastatin (LIPITOR) 40 mg tablet (Taking) Take 1 Tab by mouth nightly. aspirin 81 mg chewable tablet (Taking) Take 1 Tab by mouth daily. carvedilol (COREG) 25 mg tablet Take 1 Tab by mouth daily. losartan (COZAAR) 50 mg tablet Take 1 Tab by mouth daily. . BP today is   BP Readings from Last 1 Encounters:   12/16/19 (!) 220/120     Patient has been taking medications as prescribed. . Patient denies chest pain, shortness of breath, palpitations, lower extremity edema, dizziness/lightheadedness or syncopal episodes. Patient states that he has renal dialysis today and that his blood pressure improves significantly after dialysis. He will likely see the nephrologist/PA while he is there. At times, he is even hypotensive after dialysis. He is not checking his BP at home. Cardiovascular Review:  The patient has diabetes, coronary artery disease and history of prior MI. Diet and Lifestyle: not attempting to follow a low fat, low cholesterol diet  Home BP Monitoring: is not measured at home. Pertinent ROS: taking medications as instructed, no medication side effects noted, no TIA's, no chest pain on exertion, no dyspnea on exertion, no swelling of ankles. Diabetes Mellitus:  He has diabetes mellitus, and  hyperlipidemia. Diabetic ROS - medication compliance: compliant all of the time, diabetic diet compliance: compliant all of the time, home glucose monitoring: is performed regularly. Lab review: patient did not have his labs done as advised. Patient sees endocrinology regularly for diabetes management.  Currently taking   Key Antihyperglycemic Medications             insulin lispro (HUMALOG U-100 INSULIN) 100 unit/mL injection (Taking) 5 units for small meals 7 units medium-sized meals, 9 units for larger meals    insulin glargine (LANTUS U-100 INSULIN) 100 unit/mL injection (Taking) 10 Units by SubCUTAneous route nightly. Assessment/Plan:      Diabetes - stable  Hypertension - stable, Spoke with Dr. Catrina Otto, patient's nephrologist. Made aware of very elevated blood pressure. He advised to send patient to dialysis after he leaves my office. He is aware of elevated blood pressures. He states he will evaluate and make any necessary changes. Hyperlipidemia - stable  Coronary artery disease - stable    Diagnoses and all orders for this visit:    1.  Medicare welcome exam  -     AMB POC EKG ROUTINE W/ 12 LEADS, INTER & REP      Lab review: labs are reviewed, up to date

## 2019-12-16 NOTE — PROGRESS NOTES
Sy Padron presents today for   Chief Complaint   Patient presents with    Welcome To Medicare       Is someone accompanying this pt? No    Is the patient using any DME equipment during OV? No    Depression Screening:  3 most recent PHQ Screens 11/5/2019   PHQ Not Done -   Little interest or pleasure in doing things Several days   Feeling down, depressed, irritable, or hopeless Several days   Total Score PHQ 2 2   Trouble falling or staying asleep, or sleeping too much Not at all   Feeling tired or having little energy Not at all   Poor appetite, weight loss, or overeating Not at all   Feeling bad about yourself - or that you are a failure or have let yourself or your family down Several days   Trouble concentrating on things such as school, work, reading, or watching TV Several days   Moving or speaking so slowly that other people could have noticed; or the opposite being so fidgety that others notice Not at all   Thoughts of being better off dead, or hurting yourself in some way Not at all   PHQ 9 Score 4   How difficult have these problems made it for you to do your work, take care of your home and get along with others Not difficult at all       Learning Assessment:  No flowsheet data found. Abuse Screening:  No flowsheet data found. Fall Risk  No flowsheet data found. Health Maintenance reviewed and discussed and ordered per Provider. Health Maintenance Due   Topic Date Due    Pneumococcal 0-64 years (1 of 3 - PCV13) 03/27/1982    MICROALBUMIN Q1  03/27/1986    EYE EXAM RETINAL OR DILATED  03/27/1986    DTaP/Tdap/Td series (1 - Tdap) 03/27/1987    LIPID PANEL Q1  12/10/2018    MEDICARE YEARLY EXAM  03/06/2019    HEMOGLOBIN A1C Q6M  06/30/2019   . Coordination of Care:  1. Have you been to the ER, urgent care clinic since your last visit? Hospitalized since your last visit? No    2.  Have you seen or consulted any other health care providers outside of the [address] [place] [address] since your last visit? Include any pap smears or colon screening.  No      Last  Checked na  Last UDS Checked na  Last Pain contract signed: cecelia

## 2019-12-24 PROBLEM — N18.6 END STAGE RENAL FAILURE ON DIALYSIS (HCC): Chronic | Status: ACTIVE | Noted: 2018-06-07

## 2019-12-24 PROBLEM — Z99.2 END STAGE RENAL FAILURE ON DIALYSIS (HCC): Chronic | Status: ACTIVE | Noted: 2018-06-07

## 2020-01-31 PROBLEM — E83.52 HYPERCALCEMIA: Status: ACTIVE | Noted: 2020-01-31

## 2020-01-31 PROBLEM — N18.9 CHRONIC RENAL FAILURE: Status: ACTIVE | Noted: 2020-01-31

## 2020-02-11 PROBLEM — K29.70 GASTRITIS: Status: ACTIVE | Noted: 2020-02-11

## 2020-02-11 PROBLEM — T78.3XXA ANGIOEDEMA: Status: ACTIVE | Noted: 2020-02-11

## 2020-02-11 PROBLEM — R11.2 INTRACTABLE NAUSEA AND VOMITING: Status: ACTIVE | Noted: 2020-02-11

## 2020-02-26 ENCOUNTER — OFFICE VISIT (OUTPATIENT)
Dept: VASCULAR SURGERY | Age: 45
End: 2020-02-26

## 2020-02-26 VITALS
SYSTOLIC BLOOD PRESSURE: 168 MMHG | DIASTOLIC BLOOD PRESSURE: 80 MMHG | BODY MASS INDEX: 34.02 KG/M2 | WEIGHT: 243 LBS | RESPIRATION RATE: 16 BRPM | HEART RATE: 102 BPM | HEIGHT: 71 IN | OXYGEN SATURATION: 100 %

## 2020-02-26 DIAGNOSIS — N18.6 ESRD (END STAGE RENAL DISEASE) ON DIALYSIS (HCC): Primary | ICD-10-CM

## 2020-02-26 DIAGNOSIS — M79.89 LEFT ARM SWELLING: ICD-10-CM

## 2020-02-26 DIAGNOSIS — M35.9 AUTOIMMUNE DISEASE (HCC): ICD-10-CM

## 2020-02-26 DIAGNOSIS — M79.602 LEFT ARM PAIN: ICD-10-CM

## 2020-02-26 DIAGNOSIS — Z99.2 ESRD (END STAGE RENAL DISEASE) ON DIALYSIS (HCC): Primary | ICD-10-CM

## 2020-02-26 DIAGNOSIS — E11.21 TYPE 2 DIABETES WITH NEPHROPATHY (HCC): ICD-10-CM

## 2020-02-26 NOTE — PROGRESS NOTES
Andrew Rodgers    Chief Complaint   Patient presents with   [address] [place] [address]    Andrew Rodgers is a 38 y.o. male with end-stage renal disease on hemodialysis. He is currently dialyzing with a left upper arm AV graft. He was previously following with a vascular surgery practice in [place] but is now trying to switch his services over to our practice. He recently underwent open thrombectomy of the graft on 2/10 and has been having some swelling and pain ever since. He does also have a left IJ tunneled dialysis catheter in place. He states that he is dialyzing using his left arm graft without issue outside of the pain. He denies any fevers or chills. He does have history of left forearm AV graft which was infected and had to be removed. Otherwise he seems to be doing well and is in his usual state of health. He has no other complaints.       Past Medical History:   Diagnosis Date    Anemia     Anxiety     Autoimmune disease ([address]     HIV    Chronic kidney disease     Clostridium difficile infection     Depression     Diabetes ([address]     Diabetic foot ulcer ([address] 03/15/2018    LEFT    Dialysis patient ([address]     MWF    ESRD (end stage renal disease) on dialysis ([address]     [facility] (M-W-F)    Generalized headaches     GERD (gastroesophageal reflux disease)     Headache     Heart attack ([address]     Heart disease     HIV (human immunodeficiency virus infection) ([address]     HTN (hypertension)     Infection of AV graft for dialysis ([address]     Lymphocele     MI (myocardial infarction) ([address] 2015    MI    MRSA (methicillin resistant Staphylococcus aureus)     Sleep apnea     no cpap    Vision decreased      Patient Active Problem List   Diagnosis Code    HIV (human immunodeficiency virus infection) ([address] B20    Heart disease I51.9    Heart attack ([address] I21.9    Generalized headaches R51    Diabetes ([address] E11.9    Depression F32.9    Chronic kidney disease N18.9    Severe obesity ([facility]) E66.01    Proteinuria R80.9    HIV (human immunodeficiency virus infection) ([facility] B20    Elevated troponin R79.89    Paroxysmal tachycardia, unspecified ([facility]) I47.9    TIA (transient ischemic attack) G45.9    Chest pressure R07.89    Suicidal ideation R45.851    End stage renal failure on dialysis ([facility]) N18.6, Z99.2    Sepsis ([facility]) A41.9    Clostridium difficile colitis A04.72    Infected ulcer of skin ([facility] L98.499, L08.9    Type 2 diabetes with nephropathy ([facility]) E11.21    Type 2 diabetes mellitus with diabetic neuropathy ([facility]) E11.40    Anemia D64.9    Anxiety F41.9    Autoimmune disease ([facility]) M35.9    Diabetic foot ulcer ([facility]) E11.621, L97.509    HTN (hypertension) I10    MI ([facility] infarction) [facility] I21.9    Hypercalcemia E83.52    Chronic renal failure N18.9    Angioedema T78. 3XXA    Gastritis K29.70    Intractable nausea and vomiting R11.2     Past Surgical History:   Procedure Laterality Date    CARDIAC CATHETERIZATION  9/10/2016         CARDIAC SURG PROCEDURE UNLIST      stent    HX ARTERIOVENOUS GRAFT      HX CORONARY STENT PLACEMENT  2015    HX CYST INCISION AND DRAINAGE      Abscess removal    HX ORTHOPAEDIC      Left foot    HX VASCULAR ACCESS Left 01/2018    [facility]     Current Outpatient Medications   Medication Sig Dispense Refill    hydrALAZINE (APRESOLINE) 10 mg tablet Take 2.5 Tabs by mouth three (3) times daily for 30 days. 225 Tab 0    predniSONE (STERAPRED DS) 10 mg dose pack See administration instruction per 10mg dose pack 21 Tab 0    ondansetron (ZOFRAN ODT) 4 mg disintegrating tablet Take 1 Tab by mouth every six (6) hours as needed for Nausea. 20 Tab 0    amLODIPine (NORVASC) 10 mg tablet Take 1 Tab by mouth daily for 30 days. 30 Tab 0    cinacalcet (SENSIPAR) 30 mg tablet Take 30 mg by mouth daily.  sevelamer carbonate (RENVELA) 800 mg tab tab Take 1 Tab by mouth daily.  2 tabs tid with meals and then 1 tab tid with snacks      atazanavir (REYATAZ) 300 mg capsule Take 300 mg by mouth Daily (before breakfast).  traZODone (DESYREL) 50 mg tablet Take 50 mg by mouth nightly.  promethazine (PHENERGAN) 12.5 mg tablet Take 12.5 mg by mouth every six (6) hours as needed for Nausea.  ritonavir (NORVIR) 100 mg tablet Take 100 mg by mouth two (2) times daily (with meals).  FLUoxetine (PROZAC) 20 mg capsule Take 1 Cap by mouth daily. 90 Cap 0    omeprazole (PRILOSEC) 20 mg capsule Take 20 mg by mouth daily.  dolutegravir (TIVICAY) 50 mg tab tablet Take 1 Tab by mouth daily. 90 Tab 3    abacavir (ZIAGEN) 300 mg tablet Take 2 Tabs by mouth daily. Indications: HIV infection 180 Tab 3    carvedilol (COREG) 25 mg tablet Take 1 Tab by mouth two (2) times daily (with meals). 60 Tab 0    gabapentin (NEURONTIN) 100 mg capsule Take 1 Cap by mouth three (3) times daily. Indications: NEUROPATHIC PAIN 90 Cap 1    insulin glargine (LANTUS) 100 unit/mL injection 10 Units by SubCUTAneous route nightly. (Patient taking differently: 25 Units by SubCUTAneous route nightly.) 1 Vial 3    b complex-vitamin c-folic acid (NEPHROCAPS) 1 mg capsule Take 1 Cap by mouth daily. 30 Cap 1    buPROPion SR (WELLBUTRIN SR) 150 mg SR tablet Take 1 Tab by mouth two (2) times a day. 60 Tab 1    insulin lispro (HUMALOG) 100 unit/mL injection 5 Units by SubCUTAneous route three (3) times daily (after meals). (Patient taking differently: 5 Units by SubCUTAneous route Before breakfast, lunch, and dinner.) 1 Vial 0    aspirin 81 mg chewable tablet Take 1 Tab by mouth daily. 30 Tab 0     Allergies   Allergen Reactions    Losartan Angioedema     Patient states not allergic.     Mylanta [Aluminum-Magnesium Hydroxide] Hives    Simethicone Hives     Social History     Socioeconomic History    Marital status: LEGALLY      Spouse name: Not on file    Number of children: Not on file    Years of education: Not on file    Highest education level: Not on file   Occupational History    Not on file   Social Needs    Financial resource strain: Not on file    Food insecurity:     Worry: Not on file     Inability: Not on file    Transportation needs:     Medical: Not on file     Non-medical: Not on file   Tobacco Use    Smoking status: Current Every Day Smoker     Packs/day: 0.50     Years: 30.00     Pack years: 15.00    Smokeless tobacco: Never Used   Substance and Sexual Activity    Alcohol use: No     Frequency: Never    Drug use: Not Currently    Sexual activity: Not on file   Lifestyle    Physical activity:     Days per week: Not on file     Minutes per session: Not on file    Stress: Not on file   Relationships    Social connections:     Talks on phone: Not on file     Gets together: Not on file     Attends Shinto service: Not on file     Active member of club or organization: Not on file     Attends meetings of clubs or organizations: Not on file     Relationship status: Not on file    Intimate partner violence:     Fear of current or ex partner: Not on file     Emotionally abused: Not on file     Physically abused: Not on file     Forced sexual activity: Not on file   Other Topics Concern    Not on file   Social History Narrative    ** Merged History Encounter **           Family History   Problem Relation Age of Onset    Attention Deficit Hyperactivity Disorder Mother     Hypertension Mother     Elevated Lipids Mother     Diabetes Mother        Review of Systems    Constitutional: negative   HEENT: negative   Respiratory: negative   Cardiovascular: negative   Gastrointestinal: negative   Genitourinary:negative   Hematologic/lymphatic: negative   Musculoskeletal: Positive for left arm swelling and pain  Neurological: negative   Behavioral/Psych: negative   Endocrine: negative   Allergic/Immunologic: negative      Physical Exam:    Visit Vitals  /80 (BP 1 Location: Right arm, BP Patient Position: Sitting)   Pulse (!) 102   Resp 16   Ht 5' 11\" (1.803 m)   Wt 243 lb (110.2 kg)   SpO2 100%   BMI 33.89 kg/m²      General: Male in no acute distress   HEENT: EOMI, no scleral icterus is noted. Cardiovascular: RRR   Pulmonary: No increased work or breathing is noted. Abdomen: Obese, soft, nondistended. Extremities: Warm and well perfused bilaterally. No significant bilateral lower extremity edema. Left arm AVG with palpable thrill and good bruit. Incisions c/d/i. He does have significant swelling of the upper arm and mild in the forearm. Neuro: Cranial nerves II through XII are grossly intact   Integument: No ulcerations are identified visibly      Impression and Plan:  Robert Vicente is a 38 y.o. male with end-stage renal disease on hemodialysis. He has a left upper arm AV graft which seems to be functioning well. He did undergo open thrombectomy of the graft couple of weeks ago and has been experiencing swelling and pain in the arm ever since. He does also have a left IJ tunneled dialysis catheter in place. I discussed that we will obtain a follow-up ultrasound of his AV graft to assess for any stenosis or rethrombosis which may be contributing to his swelling and pain. He was placed in Tubigrip in the office today for gentle compression. I also advised him to elevate the arm as able for the swelling. He will follow-up after his ultrasound for further surgical discussion. I discussed that we will remove his dialysis catheter once we know that he is not having any further issues with his dialysis access. Plan was discussed. Patient expresses understanding and agrees. We reviewed the plan with the patient and the patient understands. We also gave the patient appropriate instructions on their disease process and when to call back. Greater than 50% of this visit was spent with face to face discussion. PLEASE NOTE:  This document has been produced using voice recognition software.  Unrecognized errors in transcription may be present.

## 2020-03-06 PROBLEM — I10 HYPERTENSION: Status: ACTIVE | Noted: 2020-03-06

## 2020-03-06 PROBLEM — N18.6 END STAGE RENAL DISEASE (HCC): Status: ACTIVE | Noted: 2020-03-06

## 2020-03-18 PROBLEM — J18.9 MULTIFOCAL PNEUMONIA: Status: ACTIVE | Noted: 2020-03-18

## 2020-05-21 ENCOUNTER — TELEPHONE (OUTPATIENT)
Dept: VASCULAR SURGERY | Age: 45
End: 2020-05-21

## 2020-05-21 NOTE — TELEPHONE ENCOUNTER
Patient's dialysis unit has notified our office that patient is having poor clearance to left arm dialysis graft and also some pain to the arm when palpated. Spoke with nurse Angeline Lopez at the dialysis unit and she stated that patient does not want to go back to see  and has asked to switch to our practice. Reviewed with ELIZABETH Garcia and patient will be scheduled for a left arm shuntogram and will be placed on the schedule for next week. Will have surgery scheduler contact patient with date and time.

## 2020-07-01 ENCOUNTER — TELEPHONE (OUTPATIENT)
Dept: VASCULAR SURGERY | Age: 45
End: 2020-07-01

## 2020-07-01 NOTE — TELEPHONE ENCOUNTER
Received info from dialysis unit that pt is clotted attempted to call the pt to verify that access is clotted . Left message to call back.

## 2020-07-06 PROBLEM — Z99.2 END STAGE RENAL DISEASE ON DIALYSIS (HCC): Status: ACTIVE | Noted: 2020-07-06

## 2020-07-06 PROBLEM — E87.5 ACUTE HYPERKALEMIA: Status: ACTIVE | Noted: 2020-07-06

## 2020-07-06 PROBLEM — Z78.9 PROBLEM WITH VASCULAR ACCESS: Status: ACTIVE | Noted: 2020-07-06

## 2020-07-06 PROBLEM — N18.6 END STAGE RENAL DISEASE ON DIALYSIS (HCC): Status: ACTIVE | Noted: 2020-07-06

## 2020-07-07 PROBLEM — T82.590A AV GRAFT MALFUNCTION (HCC): Status: ACTIVE | Noted: 2020-07-07

## 2020-09-19 PROBLEM — E08.621 DIABETIC ULCER OF LEFT FOOT ASSOCIATED WITH DIABETES MELLITUS DUE TO UNDERLYING CONDITION (HCC): Status: ACTIVE | Noted: 2020-09-19

## 2020-09-19 PROBLEM — E11.65 HYPERGLYCEMIA DUE TO DIABETES MELLITUS (HCC): Status: ACTIVE | Noted: 2020-09-19

## 2020-09-19 PROBLEM — R79.1 SUBTHERAPEUTIC INTERNATIONAL NORMALIZED RATIO (INR): Status: ACTIVE | Noted: 2020-09-19

## 2020-09-19 PROBLEM — L08.9 FOOT INFECTION: Status: ACTIVE | Noted: 2020-09-19

## 2020-09-19 PROBLEM — L97.509 FOOT ULCER (HCC): Status: ACTIVE | Noted: 2020-09-19

## 2020-09-19 PROBLEM — Z99.2 ESRD (END STAGE RENAL DISEASE) ON DIALYSIS (HCC): Status: ACTIVE | Noted: 2020-09-19

## 2020-09-19 PROBLEM — N18.6 ESRD (END STAGE RENAL DISEASE) ON DIALYSIS (HCC): Status: ACTIVE | Noted: 2020-09-19

## 2020-09-19 PROBLEM — E87.5 HYPERKALEMIA: Status: ACTIVE | Noted: 2020-09-19

## 2020-09-19 PROBLEM — L97.529 DIABETIC ULCER OF LEFT FOOT ASSOCIATED WITH DIABETES MELLITUS DUE TO UNDERLYING CONDITION (HCC): Status: ACTIVE | Noted: 2020-09-19

## 2020-09-19 PROBLEM — M79.606 LEG PAIN: Status: ACTIVE | Noted: 2020-09-19

## 2020-09-19 PROBLEM — D84.9 IMMUNOSUPPRESSED STATUS (HCC): Status: ACTIVE | Noted: 2020-09-19

## 2021-04-24 PROBLEM — L03.116 LEFT LEG CELLULITIS: Status: ACTIVE | Noted: 2021-04-24

## 2021-04-24 PROBLEM — L03.116 CELLULITIS OF LEFT FOOT: Status: ACTIVE | Noted: 2021-04-24

## 2021-05-25 PROBLEM — N18.6 ESRD (END STAGE RENAL DISEASE) (HCC): Status: ACTIVE | Noted: 2021-05-25

## 2021-08-03 PROBLEM — I10 HTN (HYPERTENSION): Status: RESOLVED | Noted: 2021-08-03 | Resolved: 2021-08-03

## 2022-03-18 PROBLEM — N18.6 END STAGE RENAL DISEASE (HCC): Status: ACTIVE | Noted: 2020-03-06

## 2022-03-18 PROBLEM — J18.9 MULTIFOCAL PNEUMONIA: Status: ACTIVE | Noted: 2020-03-18

## 2022-03-18 PROBLEM — L98.499 INFECTED ULCER OF SKIN (HCC): Status: ACTIVE | Noted: 2018-12-30

## 2022-03-18 PROBLEM — L97.509 DIABETIC FOOT ULCER (HCC): Status: ACTIVE | Noted: 2018-03-15

## 2022-03-18 PROBLEM — L08.9 INFECTED ULCER OF SKIN (HCC): Status: ACTIVE | Noted: 2018-12-30

## 2022-03-18 PROBLEM — R79.1 SUBTHERAPEUTIC INTERNATIONAL NORMALIZED RATIO (INR): Status: ACTIVE | Noted: 2020-09-19

## 2022-03-18 PROBLEM — M79.606 LEG PAIN: Status: ACTIVE | Noted: 2020-09-19

## 2022-03-18 PROBLEM — I10 HYPERTENSION: Status: ACTIVE | Noted: 2020-03-06

## 2022-03-18 PROBLEM — E11.40 TYPE 2 DIABETES MELLITUS WITH DIABETIC NEUROPATHY (HCC): Status: ACTIVE | Noted: 2019-11-05

## 2022-03-18 PROBLEM — L97.509 FOOT ULCER (HCC): Status: ACTIVE | Noted: 2020-09-19

## 2022-03-18 PROBLEM — D84.9 IMMUNOSUPPRESSED STATUS (HCC): Status: ACTIVE | Noted: 2020-09-19

## 2022-03-18 PROBLEM — E11.621 DIABETIC FOOT ULCER (HCC): Status: ACTIVE | Noted: 2018-03-15

## 2022-03-19 PROBLEM — Z78.9 PROBLEM WITH VASCULAR ACCESS: Status: ACTIVE | Noted: 2020-07-06

## 2022-03-19 PROBLEM — L03.116 LEFT LEG CELLULITIS: Status: ACTIVE | Noted: 2021-04-24

## 2022-03-19 PROBLEM — L08.9 FOOT INFECTION: Status: ACTIVE | Noted: 2020-09-19

## 2022-03-19 PROBLEM — E87.5 HYPERKALEMIA: Status: ACTIVE | Noted: 2020-09-19

## 2022-03-19 PROBLEM — G45.9 TIA (TRANSIENT ISCHEMIC ATTACK): Status: ACTIVE | Noted: 2017-08-29

## 2022-03-19 PROBLEM — E08.621 DIABETIC ULCER OF LEFT FOOT ASSOCIATED WITH DIABETES MELLITUS DUE TO UNDERLYING CONDITION (HCC): Status: ACTIVE | Noted: 2020-09-19

## 2022-03-19 PROBLEM — I47.9 PAROXYSMAL TACHYCARDIA, UNSPECIFIED (HCC): Status: ACTIVE | Noted: 2017-08-29

## 2022-03-19 PROBLEM — E11.21 TYPE 2 DIABETES WITH NEPHROPATHY (HCC): Status: ACTIVE | Noted: 2019-11-05

## 2022-03-19 PROBLEM — N18.6 ESRD (END STAGE RENAL DISEASE) (HCC): Status: ACTIVE | Noted: 2021-05-25

## 2022-03-19 PROBLEM — L03.116 CELLULITIS OF LEFT FOOT: Status: ACTIVE | Noted: 2021-04-24

## 2022-03-19 PROBLEM — E11.65 HYPERGLYCEMIA DUE TO DIABETES MELLITUS (HCC): Status: ACTIVE | Noted: 2020-09-19

## 2022-03-19 PROBLEM — E83.52 HYPERCALCEMIA: Status: ACTIVE | Noted: 2020-01-31

## 2022-03-19 PROBLEM — E87.5 ACUTE HYPERKALEMIA: Status: ACTIVE | Noted: 2020-07-06

## 2022-03-19 PROBLEM — Z99.2 END STAGE RENAL FAILURE ON DIALYSIS (HCC): Status: ACTIVE | Noted: 2018-06-07

## 2022-03-19 PROBLEM — N18.6 END STAGE RENAL FAILURE ON DIALYSIS (HCC): Status: ACTIVE | Noted: 2018-06-07

## 2022-03-19 PROBLEM — L97.529 DIABETIC ULCER OF LEFT FOOT ASSOCIATED WITH DIABETES MELLITUS DUE TO UNDERLYING CONDITION (HCC): Status: ACTIVE | Noted: 2020-09-19

## 2022-03-19 PROBLEM — Z99.2 ESRD (END STAGE RENAL DISEASE) ON DIALYSIS (HCC): Status: ACTIVE | Noted: 2020-09-19

## 2022-03-19 PROBLEM — E66.01 SEVERE OBESITY (HCC): Status: ACTIVE | Noted: 2019-03-26

## 2022-03-19 PROBLEM — T78.3XXA ANGIOEDEMA: Status: ACTIVE | Noted: 2020-02-11

## 2022-03-19 PROBLEM — N18.6 ESRD (END STAGE RENAL DISEASE) ON DIALYSIS (HCC): Status: ACTIVE | Noted: 2020-09-19

## 2022-03-19 PROBLEM — R45.851 SUICIDAL IDEATION: Status: ACTIVE | Noted: 2018-02-10

## 2022-03-19 PROBLEM — R07.89 CHEST PRESSURE: Status: ACTIVE | Noted: 2017-12-09

## 2022-03-19 PROBLEM — A41.9 SEPSIS (HCC): Status: ACTIVE | Noted: 2018-07-06

## 2022-03-20 PROBLEM — Z99.2 END STAGE RENAL DISEASE ON DIALYSIS (HCC): Status: ACTIVE | Noted: 2020-07-06

## 2022-03-20 PROBLEM — K29.70 GASTRITIS: Status: ACTIVE | Noted: 2020-02-11

## 2022-03-20 PROBLEM — R77.8 ELEVATED TROPONIN: Status: ACTIVE | Noted: 2017-04-05

## 2022-03-20 PROBLEM — A04.72 CLOSTRIDIUM DIFFICILE COLITIS: Status: ACTIVE | Noted: 2018-08-11

## 2022-03-20 PROBLEM — T82.590A AV GRAFT MALFUNCTION (HCC): Status: ACTIVE | Noted: 2020-07-07

## 2022-03-20 PROBLEM — N18.9 CHRONIC RENAL FAILURE: Status: ACTIVE | Noted: 2020-01-31

## 2022-03-20 PROBLEM — N18.6 END STAGE RENAL DISEASE ON DIALYSIS (HCC): Status: ACTIVE | Noted: 2020-07-06

## 2022-03-20 PROBLEM — R11.2 INTRACTABLE NAUSEA AND VOMITING: Status: ACTIVE | Noted: 2020-02-11

## 2023-01-31 RX ORDER — CINACALCET 30 MG/1
30 TABLET, FILM COATED ORAL DAILY
COMMUNITY
Start: 2019-12-17

## 2023-01-31 RX ORDER — RITONAVIR 100 MG/1
100 TABLET ORAL
COMMUNITY

## 2023-01-31 RX ORDER — TRAZODONE HYDROCHLORIDE 50 MG/1
50 TABLET ORAL
COMMUNITY

## 2023-01-31 RX ORDER — ATAZANAVIR 300 MG/1
300 CAPSULE ORAL
COMMUNITY

## 2023-01-31 RX ORDER — ATORVASTATIN CALCIUM 40 MG/1
40 TABLET, FILM COATED ORAL
COMMUNITY

## 2023-01-31 RX ORDER — AMLODIPINE BESYLATE 10 MG/1
10 TABLET ORAL DAILY
COMMUNITY
Start: 2020-06-17

## 2023-01-31 RX ORDER — INSULIN GLARGINE 100 [IU]/ML
10 INJECTION, SOLUTION SUBCUTANEOUS
COMMUNITY
Start: 2017-08-24

## 2023-01-31 RX ORDER — CARVEDILOL 25 MG/1
25 TABLET ORAL 2 TIMES DAILY WITH MEALS
COMMUNITY
Start: 2017-08-30

## 2023-01-31 RX ORDER — GABAPENTIN 100 MG/1
100 CAPSULE ORAL 3 TIMES DAILY
COMMUNITY
Start: 2017-08-24

## 2023-01-31 RX ORDER — ASPIRIN 81 MG/1
81 TABLET, CHEWABLE ORAL DAILY
COMMUNITY
Start: 2016-09-13

## 2023-01-31 RX ORDER — PANTOPRAZOLE SODIUM 40 MG/1
TABLET, DELAYED RELEASE ORAL
COMMUNITY
Start: 2020-03-04

## 2023-01-31 RX ORDER — INSULIN LISPRO 100 [IU]/ML
5 INJECTION, SOLUTION INTRAVENOUS; SUBCUTANEOUS
COMMUNITY
Start: 2017-08-24

## 2025-06-19 ENCOUNTER — NEW PATIENT (OUTPATIENT)
Age: 50
End: 2025-06-19

## 2025-06-19 DIAGNOSIS — H40.1133: ICD-10-CM

## 2025-06-19 DIAGNOSIS — E10.3293: ICD-10-CM

## 2025-06-19 PROCEDURE — 92004 COMPRE OPH EXAM NEW PT 1/>: CPT

## 2025-06-19 PROCEDURE — 92083 EXTENDED VISUAL FIELD XM: CPT
